# Patient Record
Sex: FEMALE | Race: WHITE | NOT HISPANIC OR LATINO | Employment: FULL TIME
[De-identification: names, ages, dates, MRNs, and addresses within clinical notes are randomized per-mention and may not be internally consistent; named-entity substitution may affect disease eponyms.]

---

## 2017-06-10 ENCOUNTER — HEALTH MAINTENANCE LETTER (OUTPATIENT)
Age: 27
End: 2017-06-10

## 2017-08-25 ENCOUNTER — COMMUNICATION - HEALTHEAST (OUTPATIENT)
Dept: FAMILY MEDICINE | Facility: CLINIC | Age: 27
End: 2017-08-25

## 2017-08-29 ENCOUNTER — COMMUNICATION - HEALTHEAST (OUTPATIENT)
Dept: FAMILY MEDICINE | Facility: CLINIC | Age: 27
End: 2017-08-29

## 2017-08-31 ENCOUNTER — OFFICE VISIT - HEALTHEAST (OUTPATIENT)
Dept: FAMILY MEDICINE | Facility: CLINIC | Age: 27
End: 2017-08-31

## 2017-08-31 DIAGNOSIS — T75.3XXA MOTION SICKNESS, INITIAL ENCOUNTER: ICD-10-CM

## 2017-08-31 DIAGNOSIS — R55 SYNCOPE, UNSPECIFIED SYNCOPE TYPE: ICD-10-CM

## 2017-08-31 DIAGNOSIS — M41.9 SCOLIOSIS, UNSPECIFIED SCOLIOSIS TYPE, UNSPECIFIED SPINAL REGION: ICD-10-CM

## 2017-08-31 DIAGNOSIS — R07.81 RIB PAIN ON RIGHT SIDE: ICD-10-CM

## 2017-08-31 ASSESSMENT — MIFFLIN-ST. JEOR: SCORE: 1337.76

## 2017-09-04 ENCOUNTER — COMMUNICATION - HEALTHEAST (OUTPATIENT)
Dept: FAMILY MEDICINE | Facility: CLINIC | Age: 27
End: 2017-09-04

## 2017-11-19 ENCOUNTER — COMMUNICATION - HEALTHEAST (OUTPATIENT)
Dept: FAMILY MEDICINE | Facility: CLINIC | Age: 27
End: 2017-11-19

## 2018-02-15 ENCOUNTER — COMMUNICATION - HEALTHEAST (OUTPATIENT)
Dept: FAMILY MEDICINE | Facility: CLINIC | Age: 28
End: 2018-02-15

## 2018-05-07 ENCOUNTER — COMMUNICATION - HEALTHEAST (OUTPATIENT)
Dept: FAMILY MEDICINE | Facility: CLINIC | Age: 28
End: 2018-05-07

## 2018-07-02 ENCOUNTER — RECORDS - HEALTHEAST (OUTPATIENT)
Dept: ADMINISTRATIVE | Facility: OTHER | Age: 28
End: 2018-07-02

## 2018-08-03 ENCOUNTER — COMMUNICATION - HEALTHEAST (OUTPATIENT)
Dept: FAMILY MEDICINE | Facility: CLINIC | Age: 28
End: 2018-08-03

## 2018-10-26 ENCOUNTER — COMMUNICATION - HEALTHEAST (OUTPATIENT)
Dept: FAMILY MEDICINE | Facility: CLINIC | Age: 28
End: 2018-10-26

## 2018-11-05 ENCOUNTER — OFFICE VISIT - HEALTHEAST (OUTPATIENT)
Dept: FAMILY MEDICINE | Facility: CLINIC | Age: 28
End: 2018-11-05

## 2018-11-05 DIAGNOSIS — J30.2 SEASONAL ALLERGIC RHINITIS: ICD-10-CM

## 2018-11-05 DIAGNOSIS — R31.0 GROSS HEMATURIA: ICD-10-CM

## 2018-11-05 DIAGNOSIS — I95.9 HYPOTENSION, UNSPECIFIED HYPOTENSION TYPE: ICD-10-CM

## 2018-11-05 DIAGNOSIS — R30.0 DYSURIA: ICD-10-CM

## 2018-11-05 LAB
ALBUMIN UR-MCNC: NEGATIVE MG/DL
APPEARANCE UR: CLEAR
BACTERIA #/AREA URNS HPF: ABNORMAL HPF
BILIRUB UR QL STRIP: NEGATIVE
COLOR UR AUTO: YELLOW
GLUCOSE UR STRIP-MCNC: NEGATIVE MG/DL
HGB UR QL STRIP: ABNORMAL
KETONES UR STRIP-MCNC: NEGATIVE MG/DL
LEUKOCYTE ESTERASE UR QL STRIP: NEGATIVE
MUCOUS THREADS #/AREA URNS LPF: ABNORMAL LPF
NITRATE UR QL: NEGATIVE
PH UR STRIP: 6 [PH] (ref 5–8)
RBC #/AREA URNS AUTO: ABNORMAL HPF
SP GR UR STRIP: 1.02 (ref 1–1.03)
SQUAMOUS #/AREA URNS AUTO: ABNORMAL LPF
TRANS CELLS #/AREA URNS HPF: ABNORMAL LPF
UROBILINOGEN UR STRIP-ACNC: ABNORMAL
WBC #/AREA URNS AUTO: ABNORMAL HPF

## 2018-11-05 ASSESSMENT — MIFFLIN-ST. JEOR: SCORE: 1348.64

## 2018-11-06 ENCOUNTER — AMBULATORY - HEALTHEAST (OUTPATIENT)
Dept: LAB | Facility: CLINIC | Age: 28
End: 2018-11-06

## 2018-11-06 DIAGNOSIS — R31.0 GROSS HEMATURIA: ICD-10-CM

## 2018-11-06 LAB — BACTERIA SPEC CULT: NO GROWTH

## 2018-11-08 ENCOUNTER — AMBULATORY - HEALTHEAST (OUTPATIENT)
Dept: LAB | Facility: CLINIC | Age: 28
End: 2018-11-08

## 2018-11-08 DIAGNOSIS — R31.0 GROSS HEMATURIA: ICD-10-CM

## 2018-11-08 LAB
ALBUMIN UR-MCNC: NEGATIVE MG/DL
APPEARANCE UR: CLEAR
BACTERIA #/AREA URNS HPF: ABNORMAL HPF
BILIRUB UR QL STRIP: NEGATIVE
COLOR UR AUTO: YELLOW
GLUCOSE UR STRIP-MCNC: NEGATIVE MG/DL
HGB UR QL STRIP: ABNORMAL
KETONES UR STRIP-MCNC: NEGATIVE MG/DL
LEUKOCYTE ESTERASE UR QL STRIP: NEGATIVE
NITRATE UR QL: NEGATIVE
PH UR STRIP: 6.5 [PH] (ref 5–8)
RBC #/AREA URNS AUTO: ABNORMAL HPF
SP GR UR STRIP: 1.01 (ref 1–1.03)
SQUAMOUS #/AREA URNS AUTO: ABNORMAL LPF
UROBILINOGEN UR STRIP-ACNC: ABNORMAL
WBC #/AREA URNS AUTO: ABNORMAL HPF

## 2018-12-12 ENCOUNTER — COMMUNICATION - HEALTHEAST (OUTPATIENT)
Dept: FAMILY MEDICINE | Facility: CLINIC | Age: 28
End: 2018-12-12

## 2019-02-15 ENCOUNTER — OFFICE VISIT - HEALTHEAST (OUTPATIENT)
Dept: FAMILY MEDICINE | Facility: CLINIC | Age: 29
End: 2019-02-15

## 2019-02-15 DIAGNOSIS — J06.9 VIRAL URI WITH COUGH: ICD-10-CM

## 2019-02-15 ASSESSMENT — MIFFLIN-ST. JEOR: SCORE: 1345.47

## 2019-02-26 ENCOUNTER — COMMUNICATION - HEALTHEAST (OUTPATIENT)
Dept: FAMILY MEDICINE | Facility: CLINIC | Age: 29
End: 2019-02-26

## 2019-03-15 ENCOUNTER — COMMUNICATION - HEALTHEAST (OUTPATIENT)
Dept: FAMILY MEDICINE | Facility: CLINIC | Age: 29
End: 2019-03-15

## 2019-06-10 ENCOUNTER — COMMUNICATION - HEALTHEAST (OUTPATIENT)
Dept: FAMILY MEDICINE | Facility: CLINIC | Age: 29
End: 2019-06-10

## 2019-09-30 ENCOUNTER — HEALTH MAINTENANCE LETTER (OUTPATIENT)
Age: 29
End: 2019-09-30

## 2019-11-11 ENCOUNTER — OFFICE VISIT - HEALTHEAST (OUTPATIENT)
Dept: FAMILY MEDICINE | Facility: CLINIC | Age: 29
End: 2019-11-11

## 2019-11-11 DIAGNOSIS — J30.2 SEASONAL ALLERGIC RHINITIS, UNSPECIFIED TRIGGER: ICD-10-CM

## 2019-11-11 DIAGNOSIS — Z00.00 ROUTINE GENERAL MEDICAL EXAMINATION AT A HEALTH CARE FACILITY: ICD-10-CM

## 2019-11-11 LAB
ALBUMIN SERPL-MCNC: 4.2 G/DL (ref 3.5–5)
ALBUMIN UR-MCNC: NEGATIVE MG/DL
ALP SERPL-CCNC: 56 U/L (ref 45–120)
ALT SERPL W P-5'-P-CCNC: 13 U/L (ref 0–45)
ANION GAP SERPL CALCULATED.3IONS-SCNC: 8 MMOL/L (ref 5–18)
APPEARANCE UR: CLEAR
AST SERPL W P-5'-P-CCNC: 17 U/L (ref 0–40)
BACTERIA #/AREA URNS HPF: ABNORMAL HPF
BILIRUB SERPL-MCNC: 0.3 MG/DL (ref 0–1)
BILIRUB UR QL STRIP: NEGATIVE
BUN SERPL-MCNC: 13 MG/DL (ref 8–22)
CALCIUM SERPL-MCNC: 9.6 MG/DL (ref 8.5–10.5)
CHLORIDE BLD-SCNC: 103 MMOL/L (ref 98–107)
CHOLEST SERPL-MCNC: 274 MG/DL
CO2 SERPL-SCNC: 29 MMOL/L (ref 22–31)
COLOR UR AUTO: YELLOW
CREAT SERPL-MCNC: 0.77 MG/DL (ref 0.6–1.1)
ERYTHROCYTE [DISTWIDTH] IN BLOOD BY AUTOMATED COUNT: 13 % (ref 11–14.5)
FASTING STATUS PATIENT QL REPORTED: NO
GFR SERPL CREATININE-BSD FRML MDRD: >60 ML/MIN/1.73M2
GLUCOSE BLD-MCNC: 79 MG/DL (ref 70–125)
GLUCOSE UR STRIP-MCNC: NEGATIVE MG/DL
HCT VFR BLD AUTO: 42.7 % (ref 35–47)
HDLC SERPL-MCNC: 100 MG/DL
HGB BLD-MCNC: 14.1 G/DL (ref 12–16)
HGB UR QL STRIP: ABNORMAL
KETONES UR STRIP-MCNC: NEGATIVE MG/DL
LDLC SERPL CALC-MCNC: 159 MG/DL
LEUKOCYTE ESTERASE UR QL STRIP: NEGATIVE
MCH RBC QN AUTO: 28.5 PG (ref 27–34)
MCHC RBC AUTO-ENTMCNC: 33.1 G/DL (ref 32–36)
MCV RBC AUTO: 86 FL (ref 80–100)
NITRATE UR QL: NEGATIVE
PH UR STRIP: 7.5 [PH] (ref 5–8)
PLATELET # BLD AUTO: 378 THOU/UL (ref 140–440)
PMV BLD AUTO: 8.5 FL (ref 7–10)
POTASSIUM BLD-SCNC: 4.3 MMOL/L (ref 3.5–5)
PROT SERPL-MCNC: 7.5 G/DL (ref 6–8)
RBC # BLD AUTO: 4.95 MILL/UL (ref 3.8–5.4)
RBC #/AREA URNS AUTO: ABNORMAL HPF
SODIUM SERPL-SCNC: 140 MMOL/L (ref 136–145)
SP GR UR STRIP: 1.02 (ref 1–1.03)
SQUAMOUS #/AREA URNS AUTO: ABNORMAL LPF
TRIGL SERPL-MCNC: 77 MG/DL
TSH SERPL DL<=0.005 MIU/L-ACNC: 1.65 UIU/ML (ref 0.3–5)
UROBILINOGEN UR STRIP-ACNC: ABNORMAL
WBC #/AREA URNS AUTO: ABNORMAL HPF
WBC: 11.1 THOU/UL (ref 4–11)

## 2019-11-11 ASSESSMENT — MIFFLIN-ST. JEOR: SCORE: 1351.82

## 2019-11-21 ENCOUNTER — OFFICE VISIT - HEALTHEAST (OUTPATIENT)
Dept: FAMILY MEDICINE | Facility: CLINIC | Age: 29
End: 2019-11-21

## 2019-11-21 DIAGNOSIS — Z12.4 CERVICAL CANCER SCREENING: ICD-10-CM

## 2019-11-21 DIAGNOSIS — Z71.84 ENCOUNTER FOR COUNSELING FOR TRAVEL: ICD-10-CM

## 2019-11-22 LAB
HPV SOURCE: NORMAL
HUMAN PAPILLOMA VIRUS 16 DNA: NEGATIVE
HUMAN PAPILLOMA VIRUS 18 DNA: NEGATIVE
HUMAN PAPILLOMA VIRUS FINAL DIAGNOSIS: NORMAL
HUMAN PAPILLOMA VIRUS OTHER HR: NEGATIVE
SPECIMEN DESCRIPTION: NORMAL

## 2019-11-27 ENCOUNTER — COMMUNICATION - HEALTHEAST (OUTPATIENT)
Dept: SCHEDULING | Facility: CLINIC | Age: 29
End: 2019-11-27

## 2019-11-27 DIAGNOSIS — Z71.84 ENCOUNTER FOR COUNSELING FOR TRAVEL: ICD-10-CM

## 2019-12-03 LAB
BKR LAB AP ABNORMAL BLEEDING: NO
BKR LAB AP BIRTH CONTROL/HORMONES: ABNORMAL
BKR LAB AP CERVICAL APPEARANCE: NORMAL
BKR LAB AP GYN ADEQUACY: ABNORMAL
BKR LAB AP GYN INTERPRETATION: ABNORMAL
BKR LAB AP HPV REFLEX: ABNORMAL
BKR LAB AP LMP: ABNORMAL
BKR LAB AP PATIENT STATUS: ABNORMAL
BKR LAB AP PREVIOUS ABNORMAL: ABNORMAL
BKR LAB AP PREVIOUS NORMAL: ABNORMAL
HIGH RISK?: NO
PATH REPORT.COMMENTS IMP SPEC: ABNORMAL
RESULT FLAG (HE HISTORICAL CONVERSION): ABNORMAL

## 2019-12-04 ENCOUNTER — COMMUNICATION - HEALTHEAST (OUTPATIENT)
Dept: FAMILY MEDICINE | Facility: CLINIC | Age: 29
End: 2019-12-04

## 2020-02-20 ENCOUNTER — COMMUNICATION - HEALTHEAST (OUTPATIENT)
Dept: FAMILY MEDICINE | Facility: CLINIC | Age: 30
End: 2020-02-20

## 2020-02-20 DIAGNOSIS — Z78.9 USES BIRTH CONTROL: ICD-10-CM

## 2020-03-10 ENCOUNTER — MYC MEDICAL ADVICE (OUTPATIENT)
Dept: FAMILY MEDICINE | Facility: CLINIC | Age: 30
End: 2020-03-10

## 2020-03-12 ENCOUNTER — AMBULATORY - HEALTHEAST (OUTPATIENT)
Dept: NURSING | Facility: CLINIC | Age: 30
End: 2020-03-12

## 2020-09-17 ENCOUNTER — OFFICE VISIT - HEALTHEAST (OUTPATIENT)
Dept: FAMILY MEDICINE | Facility: CLINIC | Age: 30
End: 2020-09-17

## 2020-09-17 DIAGNOSIS — Z23 NEED FOR VACCINATION AGAINST HEPATITIS A: ICD-10-CM

## 2020-09-17 DIAGNOSIS — B37.2 MONILIAL INTERTRIGO: ICD-10-CM

## 2020-09-17 LAB
CLUE CELLS: NORMAL
TRICHOMONAS, WET PREP: NORMAL
YEAST, WET PREP: NORMAL

## 2020-11-05 ENCOUNTER — COMMUNICATION - HEALTHEAST (OUTPATIENT)
Dept: FAMILY MEDICINE | Facility: CLINIC | Age: 30
End: 2020-11-05

## 2020-11-05 DIAGNOSIS — Z78.9 USES BIRTH CONTROL: ICD-10-CM

## 2021-01-15 ENCOUNTER — HEALTH MAINTENANCE LETTER (OUTPATIENT)
Age: 31
End: 2021-01-15

## 2021-02-10 ENCOUNTER — OFFICE VISIT - HEALTHEAST (OUTPATIENT)
Dept: FAMILY MEDICINE | Facility: CLINIC | Age: 31
End: 2021-02-10

## 2021-02-10 ENCOUNTER — AMBULATORY - HEALTHEAST (OUTPATIENT)
Dept: FAMILY MEDICINE | Facility: CLINIC | Age: 31
End: 2021-02-10

## 2021-02-10 DIAGNOSIS — R09.81 NASAL CONGESTION: ICD-10-CM

## 2021-02-12 ENCOUNTER — COMMUNICATION - HEALTHEAST (OUTPATIENT)
Dept: FAMILY MEDICINE | Facility: CLINIC | Age: 31
End: 2021-02-12

## 2021-02-13 ENCOUNTER — COMMUNICATION - HEALTHEAST (OUTPATIENT)
Dept: SCHEDULING | Facility: CLINIC | Age: 31
End: 2021-02-13

## 2021-05-29 NOTE — TELEPHONE ENCOUNTER
Refill Approved    Rx renewed per Medication Renewal Policy. Medication was last renewed on 3/18/19.    Barbie Pedro, [facility] Triage/Med Refill 6/11/2019     Requested Prescriptions   Pending Prescriptions Disp Refills     drospirenone-ethinyl estradiol (GIANVI, 28,) 3-0.02 mg per tablet [Pharmacy Med Name: GIANVI 3 MG-0.02 MG TABLET] 84 tablet 0     Sig: TAKE 1 TAB BY MOUTH ONE TIME DAILY, NO FURTHER REFILLS UNTIL SEEN FOR A PHYSICAL. CALL 24/7       Oral Contraceptives Protocol Passed - 6/10/2019 12:35 PM        Passed - Visit with PCP or prescribing provider visit in last 12 months      Last office visit with prescriber/PCP: 6/10/2016 Cal Perera MD OR same dept: 11/5/2018 Juan Dalton MD OR same specialty: 2/15/2019 Kenya Keith MD  Last physical: Visit date not found Last MTM visit: Visit date not found   Next visit within 3 mo: Visit date not found  Next physical within 3 mo: Visit date not found  Prescriber OR PCP: Cal Perera MD  Last diagnosis associated with med order: 1. Birth control  - GIANVI, 28, 3-0.02 mg per tablet [Pharmacy Med Name: GIANVI 3 MG-0.02 MG TABLET]; TAKE 1 TAB BY MOUTH ONE TIME DAILY, NO FURTHER REFILLS UNTIL SEEN FOR A PHYSICAL. CALL 24/7  Dispense: 84 tablet; Refill: 0    If protocol passes may refill for 12 months if within 3 months of last provider visit (or a total of 15 months).

## 2021-05-31 VITALS — WEIGHT: 133.6 LBS | HEIGHT: 66 IN | BODY MASS INDEX: 21.47 KG/M2

## 2021-06-02 VITALS — BODY MASS INDEX: 21.86 KG/M2 | HEIGHT: 66 IN | WEIGHT: 136 LBS

## 2021-06-02 VITALS — HEIGHT: 66 IN | WEIGHT: 135.3 LBS | BODY MASS INDEX: 21.74 KG/M2

## 2021-06-03 VITALS
WEIGHT: 136.7 LBS | BODY MASS INDEX: 21.97 KG/M2 | SYSTOLIC BLOOD PRESSURE: 104 MMHG | HEIGHT: 66 IN | DIASTOLIC BLOOD PRESSURE: 68 MMHG | HEART RATE: 80 BPM

## 2021-06-03 NOTE — TELEPHONE ENCOUNTER
Medication Question or Clarification  Who is calling: Pharmacy: [facility]  What medication are you calling about? (include dose and sig)   typhoid (VIVOTIF) SR capsule 4 capsule 0 11/21/2019 11/28/2019    Sig - Route: Take 1 capsule by mouth every other day for 7 days. - Oral    Sent to pharmacy as: typhoid vaccine,live,attenuated 2 billion unit capsule,delayed release (VIVOTIF)    E-Prescribing Status: Receipt confirmed by pharmacy (11/21/2019 10:49 AM CST)        Who prescribed the medication?: Cal Perera MD  What is your question/concern?: the patient in error has already taken this medication.  The patient is not leaving out of the country until 4/20.  Pharmacy is requesting another prescription for patient to take just prior to departure.  Pharmacy: [facility] # [id number]  Okay to leave a detailed message?: Yes  Site CMT - Please call the pharmacy to obtain any additional needed information.

## 2021-06-03 NOTE — PATIENT INSTRUCTIONS - HE
Have had hepatitis A vaccine.  If you have not, I would recommend that you schedule a nurse only visit for this

## 2021-06-03 NOTE — PROGRESS NOTES
Assessment:     1. Routine general medical examination at a health care facility  Comprehensive Metabolic Panel    HM2(CBC w/o Differential)    Urinalysis-UC if Indicated    Lipid Cascade    Thyroid [place]   2. Seasonal allergic rhinitis, unspecified trigger  HM2(CBC w/o Differential)       Plan:     1. Routine general medical examination at a health care facility  Patient will follow-up with Dr. Lise Bond for her Pap smear and follow-up of her colposcopy with her abnormal Pap of 3 or 4 years ago  - Comprehensive Metabolic Panel  - HM2(CBC w/o Differential)  - Urinalysis-UC if Indicated  - Lipid Cascade  - Thyroid [place]    2. Seasonal allergic rhinitis, unspecified trigger  Patient will discontinue her generic Afrin daily nasal insufflation; if she needs help with rebound we may prescribe some corticosteroid  - HM2(CBC w/o Differential)      Subjective:   Elaine comes in for general physical exam.  Unfortunately there was some confusion and signing up as she will need a follow-up Pap smear after an abnormal past medical history and colposcopy follow-up.  She will make this appointment with Dr. Bond her primary care physician.  Today we will do basic blood screening as she is 29 years old.  She is had a past medical history of borderline thyroid disease probably related to a viral infection which sounds like an investigation was done for some Hashimoto variant.  Ultimately work-up did ended with no real physical findings and no indication for treatment.  I feel today I will just repeat a thyroid cascade.  We will get a basic metabolic profile and hemogram.  Patient gives a negative review of systems save for an unfortunate incident where she had a root canal in her upper left last molar where during anesthesia piece of 30-gauge needle broke off whereby the anesthetic ended up coming out the patient's nose.  Ultimately a oral surgeon was called in the small millimeter to 2 mm piece of needle left in her  "paranasal sinus area.  She is had a flare of her allergic rhinitis since this episode and has been using generic Afrin.  We are going to get her off of that as soon as possible here we may offers some corticosteroid relief for that.  She will follow-up with Dr. Bond when she comes in for her Pap smear.  All medical questions asked were answered I have given the patient interim care here and will be happy to follow along.  Very pleasant young lady.    Review of Systems: A complete 14 point review of systems was obtained and is negative or as stated in the history of present illness.    Past Medical History:   Diagnosis Date     Environmental allergies      Family History   Problem Relation Age of Onset     No Medical Problems Mother      Arthritis Maternal Grandmother      Diabetes Maternal Grandmother      Kidney disease Maternal Grandmother         going to have kidney transplant     Diabetes Paternal Grandmother      Diabetes Paternal Grandfather      Vision loss Paternal Grandfather      No Medical Problems Sister      Vision loss Maternal Grandfather      Past Surgical History:   Procedure Laterality Date     TONSILLECTOMY AND ADENOIDECTOMY       Social History     Tobacco Use     Smoking status: Never Smoker     Smokeless tobacco: Never Used   Substance Use Topics     Alcohol use: Yes     Alcohol/week: 1.0 standard drinks     Types: 1 Glasses of wine per week     Comment: Socially     Drug use: No         Objective:   /68   Pulse 80   Ht 5' 6\" (1.676 m)   Wt 136 lb 11.2 oz (62 kg)   LMP  (LMP Unknown)   BMI 22.06 kg/m      General Appearance:  Alert, cooperative, no distress  Head:  Normocephalic, no obvious abnormality  Ears: TM anatomy normal  Eyes:  PERRL, EOM's intact, conjunctiva and corneas clear  Nose:  Nares symmetrical, septum midline, mucosa pink, no sinus tenderness  Throat:  Lips, tongue, and mucosa are moist, pink, and intact  Neck:  Supple, symmetrical, trachea midline, no adenopathy; " thyroid: no enlargement, symmetric,no tenderness/mass/nodules; no carotid bruit, no JVD  Back:  Symmetrical, no curvature, ROM normal, no CVA tenderness  Chest/Breast:  No mass or tenderness  Lungs:  Clear to auscultation bilaterally, respirations unlabored   Heart:  Normal PMI, regular rate & rhythm, S1 and S2 normal, no murmurs, rubs, or gallops  Abdomen:  Soft, non-tender, bowel sounds active all four quadrants, no mass, or organomegaly  Musculoskeletal:  Tone and strength strong and symmetrical, all extremities  Lymphatic:  No adenopathy  Skin/Hair/Nails:  Skin warm, dry, and intact, no rashes  Neurologic:  Alert and oriented x3, no cranial nerve deficits, normal strength and tone, gait steady  Extremities:  No edema.  Malu's sign negative.    Genitourinary: deferred  Pulses:  Equal bilaterally           This note has been dictated using voice recognition software. Any grammatical or context distortions are unintentional and inherent to the the software.

## 2021-06-03 NOTE — PROGRESS NOTES
Assessment/Plan:     1. Cervical cancer screening  Gynecologic Cytology (PAP Smear)   2. Encounter for counseling for travel  typhoid (VIVOTIF) SR capsule    azithromycin (ZITHROMAX) 500 MG tablet       Diagnoses and all orders for this visit:    Cervical cancer screening  -     Gynecologic Cytology (PAP Smear)    Encounter for counseling for travel  -     typhoid (VIVOTIF) SR capsule; Take 1 capsule by mouth every other day for 7 days.  Dispense: 4 capsule; Refill: 0  -     azithromycin (ZITHROMAX) 500 MG tablet; Take 1 tablet (500 mg total) by mouth daily for 3 days. Take as needed for traveler's diarrhea  Dispense: 3 tablet; Refill: 0  -Reviewed CDC recommendations for travel to [facility].  She believes that she has had hepatitis a and B vaccines but will check at home for those vaccine records.  We discussed that she could return for nurse only visit to receive these vaccines if needed.  Recommend influenza vaccine and this is administered today.  Counseled on vaccine and side effects.  Recommend typhoid vaccine and prescription is sent to pharmacy.  Counseled on use of this vaccine and side effects.  Discussed importance of sunscreen and insect repellent.  Discussed general over-the-counter medications that should be brought along during travel.  Provided prescription for azithromycin to use as needed for traveler's diarrhea.  Counseled on use of this medication and side effects.    Need for influenza vaccine  -     Influenza,Seasonal,Quad,INJ =/>6months    Preconception counseling:  Discussed starting a prenatal vitamin in anticipation of pregnancy.  Discussed that with travel to [facility], there is unknown risk associated with Zika virus and she should stay up-to-date on CDC information.  She should avoid attempting pregnancy for 3 months following travel to [place].  Discussed importance of healthy diet, regular exercise, rest and avoidance of alcohol while trying to conceive.       Subjective:      Elaine GAO  Todd is a 29 y.o. female who comes in today for Pap smear.  She was seen last week for a physical with Dr. Paz.  She was having her menstrual cycle at the time and did not want to have a Pap smear performed.  She reports that her last menstrual period started on 11/10/2019.  She is on birth control pills.  Last Pap smear was in 2016.  This showed atypical squamous cells of undetermined significance but HPV test was negative.  Follow-up Pap in 3 years was recommended.  She also wishes to discuss recommendations for preparing for pregnancy.  She is thinking of going off of birth control at next summer and trying for pregnancy.  She is also planning a trip to [facility] in April and would like to discuss any recommended vaccines that would be needed for upcoming travel.  We reviewed her medications and allergies.  Chart is updated.  Review of systems otherwise negative.  No other concerns or questions today.    Current Outpatient Medications   Medication Sig Dispense Refill     amoxicillin (AMOXIL) 500 MG tablet Take 500 mg by mouth 3 (three) times a day.       azithromycin (ZITHROMAX) 500 MG tablet Take 1 tablet (500 mg total) by mouth daily for 3 days. Take as needed for traveler's diarrhea 3 tablet 0     cranberry extract 50 mg Chew Chew.       drospirenone-ethinyl estradiol (GIANVI, 28,) 3-0.02 mg per tablet TAKE 1 TAB BY MOUTH ONE TIME DAILY, NO FURTHER REFILLS UNTIL SEEN FOR A PHYSICAL. CALL 24/7 84 tablet 2     multivitamin with minerals (THERA-M) 9 mg iron-400 mcg Tab tablet Take 1 tablet by mouth daily.       traMADol (ULTRAM) 50 mg tablet Take 1 tablet (50 mg total) by mouth every 6 (six) hours as needed for pain. 8 tablet 0     typhoid (VIVOTIF) SR capsule Take 1 capsule by mouth every other day for 7 days. 4 capsule 0     No current facility-administered medications for this visit.        Past Medical History, Family History, and Social History reviewed.  Past Medical History:   Diagnosis Date      Environmental allergies      Past Surgical History:   Procedure Laterality Date     TONSILLECTOMY AND ADENOIDECTOMY       Sulfa (sulfonamide antibiotics) and Sulfacetamide  Family History   Problem Relation Age of Onset     No Medical Problems Mother      Arthritis Maternal Grandmother      Diabetes Maternal Grandmother      Kidney disease Maternal Grandmother         going to have kidney transplant     Diabetes Paternal Grandmother      Diabetes Paternal Grandfather      Vision loss Paternal Grandfather      No Medical Problems Sister      Vision loss Maternal Grandfather      Social History     Socioeconomic History     Marital status: Single     Spouse name: Not on file     Number of children: Not on file     Years of education: Not on file     Highest education level: Not on file   Occupational History     Not on file   Social Needs     Financial resource strain: Not on file     Food insecurity:     Worry: Not on file     Inability: Not on file     Transportation needs:     Medical: Not on file     Non-medical: Not on file   Tobacco Use     Smoking status: Never Smoker     Smokeless tobacco: Never Used   Substance and Sexual Activity     Alcohol use: Yes     Alcohol/week: 1.0 standard drinks     Types: 1 Glasses of wine per week     Comment: Socially     Drug use: No     Sexual activity: Yes     Partners: Male     Birth control/protection: Pill, OCP     Comment: in relationship    Lifestyle     Physical activity:     Days per week: Not on file     Minutes per session: Not on file     Stress: Not on file   Relationships     Social connections:     Talks on phone: Not on file     Gets together: Not on file     Attends Judaism service: Not on file     Active member of club or organization: Not on file     Attends meetings of clubs or organizations: Not on file     Relationship status: Not on file     Intimate partner violence:     Fear of current or ex partner: Not on file     Emotionally abused: Not on file      Physically abused: Not on file     Forced sexual activity: Not on file   Other Topics Concern     Not on file   Social History Narrative     Not on file         Review of systems is as stated in HPI, and the remainder of the 10 system review is otherwise negative.    Objective:     Vitals:    11/21/19 1024   BP: 100/60   Patient Site: Left Arm   Patient Position: Sitting   Cuff Size: Adult Regular   Pulse: 90   Temp: 98.1  F (36.7  C)   TempSrc: Oral   SpO2: 95%   Weight: 140 lb 12.8 oz (63.9 kg)    Body mass index is 22.73 kg/m .    General appearance: alert, appears stated age and cooperative  Pelvic: cervix normal in appearance, external genitalia normal, no adnexal masses or tenderness, no cervical motion tenderness, rectovaginal septum normal, uterus normal size, shape, and consistency and vagina normal without discharge          This note has been dictated using voice recognition software. Any grammatical or context distortions are unintentional and inherent to the the software.

## 2021-06-04 VITALS
HEART RATE: 90 BPM | BODY MASS INDEX: 22.73 KG/M2 | DIASTOLIC BLOOD PRESSURE: 60 MMHG | TEMPERATURE: 98.1 F | OXYGEN SATURATION: 95 % | WEIGHT: 140.8 LBS | SYSTOLIC BLOOD PRESSURE: 100 MMHG

## 2021-06-04 NOTE — TELEPHONE ENCOUNTER
Left message to call back for: results  Information to relay to patient:  Ok to give pt results upon return call.

## 2021-06-04 NOTE — TELEPHONE ENCOUNTER
----- Message from Lise Bond MD sent at 12/4/2019  7:14 AM CST -----  Please let pt know that her pap smear shows atypical cells but HPV test is negative.  Based on current guidelines a follow-up pap smear in 3 years is recommended

## 2021-06-05 VITALS
BODY MASS INDEX: 21.73 KG/M2 | HEART RATE: 80 BPM | OXYGEN SATURATION: 98 % | WEIGHT: 134.6 LBS | SYSTOLIC BLOOD PRESSURE: 104 MMHG | DIASTOLIC BLOOD PRESSURE: 72 MMHG

## 2021-06-06 NOTE — TELEPHONE ENCOUNTER
Refill Approved    Rx renewed per Medication Renewal Policy. Medication was last renewed on 6/11/19.    Christina Han, [facility] Triage/Med Refill 2/23/2020     Requested Prescriptions   Pending Prescriptions Disp Refills     drospirenone-ethinyl estradioL (GIANVI, 28,) 3-0.02 mg per tablet [Pharmacy Med Name: GIANVI 3 MG-0.02 MG TABLET] 84 tablet 2     Sig: TAKE ONE TABLET BY MOUTH ONCE DAILY. NO FURTHER REFILLS UNTIL SEEN       Oral Contraceptives Protocol Passed - 2/20/2020  2:48 AM        Passed - Visit with PCP or prescribing provider visit in last 12 months      Last office visit with prescriber/PCP: 6/10/2016 Cal Perera MD OR same dept: 11/21/2019 Lise Bond MD OR same specialty: 11/21/2019 Lise Bond MD  Last physical: 11/11/2019 Last MTM visit: Visit date not found   Next visit within 3 mo: Visit date not found  Next physical within 3 mo: Visit date not found  Prescriber OR PCP: Cal Perera MD  Last diagnosis associated with med order: 1. Uses birth control  - GIANVI, 28, 3-0.02 mg per tablet [Pharmacy Med Name: GIANVI 3 MG-0.02 MG TABLET]; TAKE ONE TABLET BY MOUTH ONCE DAILY. NO FURTHER REFILLS UNTIL SEEN  Dispense: 84 tablet; Refill: 2    If protocol passes may refill for 12 months if within 3 months of last provider visit (or a total of 15 months).

## 2021-06-11 NOTE — PROGRESS NOTES
Assessment:     1. Monilial intertrigo  Wet Prep, Vaginal    triamcinolone (KENALOG) 0.1 % cream   2. Need for vaccination against hepatitis A         Plan:     1. Monilial intertrigo  Patient did use some Lotrimin cream which inflamed the area further a wet prep was negative for all pathogens she needs an anti-inflammatory agent and will prescribe the following twice daily  - Wet Prep, Vaginal  - triamcinolone (KENALOG) 0.1 % cream; Apply twice daily  Dispense: 60 g; Refill: 1    2. Need for vaccination against hepatitis A  Updated as follows      Subjective:   Graciela noticed some red spots on her left labial area which increased in size and discomfort after a gym workout she became very sweaty during the workout and did not shower afterwards the inflammatory area increased and went into her intertriginous areas between her buttock and has now spread.  She has had no discharge she did buy some OTC monilial cream and suppositories which just inflamed area further.  Suspicion is for intertrigo secondary to inappropriate medication and the effect of a workout.  We will prescribe a corticosteroid she will apply it twice daily and I think she will do fine.    Review of Systems: A complete 14 point review of systems was obtained and is negative or as stated in the history of present illness.    Past Medical History:   Diagnosis Date     Environmental allergies      Family History   Problem Relation Age of Onset     No Medical Problems Mother      Arthritis Maternal Grandmother      Diabetes Maternal Grandmother      Kidney disease Maternal Grandmother         going to have kidney transplant     Diabetes Paternal Grandmother      Diabetes Paternal Grandfather      Vision loss Paternal Grandfather      No Medical Problems Sister      Vision loss Maternal Grandfather      Past Surgical History:   Procedure Laterality Date     TONSILLECTOMY AND ADENOIDECTOMY       Social History     Tobacco Use     Smoking status: Never Smoker      Smokeless tobacco: Never Used   Substance Use Topics     Alcohol use: Yes     Alcohol/week: 1.0 standard drinks     Types: 1 Glasses of wine per week     Comment: Socially     Drug use: No         Objective:   /72 (Patient Site: Right Arm, Patient Position: Sitting, Cuff Size: Adult Regular)   Pulse 80   Wt 134 lb 9.6 oz (61.1 kg)   SpO2 98%   BMI 21.73 kg/m      General Appearance:  Alert, cooperative, no distress  Head:  Normocephalic, no obvious abnormality  Ears: TM anatomy normal  Eyes:  PERRL, EOM's intact, conjunctiva and corneas clear  Nose:  Nares symmetrical, septum midline, mucosa pink, no sinus tenderness  Throat:  Lips, tongue, and mucosa are moist, pink, and intact  Neck:  Supple, symmetrical, trachea midline, no adenopathy; thyroid: no enlargement, symmetric,no tenderness/mass/nodules; no carotid bruit, no JVD  Back:  Symmetrical, no curvature, ROM normal, no CVA tenderness  Chest/Breast:  No mass or tenderness  Lungs:  Clear to auscultation bilaterally, respirations unlabored   Heart:  Normal PMI, regular rate & rhythm, S1 and S2 normal, no murmurs, rubs, or gallops  Abdomen:  Soft, non-tender, bowel sounds active all four quadrants, no mass, or organomegaly  Musculoskeletal:  Tone and strength strong and symmetrical, all extremities  Lymphatic:  No adenopathy  Skin/Hair/Nails:  Skin warm, dry, and intact, no rashes  Neurologic:  Alert and oriented x3, no cranial nerve deficits, normal strength and tone, gait steady  Extremities:  No edema.  Malu's sign negative.    Genitourinary: External vaginal exam reveals intertrigo but no discharge on the inner vault a wet prep was negative pulses:  Equal bilaterally           This note has been dictated using voice recognition software. Any grammatical or context distortions are unintentional and inherent to the the software.

## 2021-06-12 NOTE — PROGRESS NOTES
"Assessment and Plan:     1. Rib pain on right side     2. Scoliosis, unspecified scoliosis type, unspecified spinal region     3. Motion sickness, initial encounter     4. Syncope, unspecified syncope type       I reviewed the paperwork with her regarding possible complications with scuba diving.  Patient has a relative risk of complications with scoliosis.  I also discussed my concerns of the pressure within the water causing her ribs symptoms.  She has a history of fainting, but it appears as though she had causes including illness, dehydration, and heat exposure.  I told her that I would like to discuss this further with her PCP, Dr. Perera.  She is content with the plan.    ADDENDUM:  I spoke with Dr. Perera who is comfortable with her attending scuba diving lessons.  I completed the paperwork.     Subjective:     Elaine is a 27 y.o. female presenting to the clinic for for completion of paperwork.  Patient signed up for scuba course at her school.  Patient had answered yes within her medical history to frequent motion sickness and history of fainting.  Patient states she has passed out 3 times in the past.  The first occurred at the age of 6 when she was ill.  The second occurred at the age of 22 she was dehydrated after alcohol consumption.  Her third episode happened 2 years ago when she was sitting in the sun for too long.  Patient states her symptoms lasted for a few seconds.  She had no long-term complications.  She did not need to go the emergency room.  Patient also takes tramadol as needed for \"a rib that pops out.  \"Patient sees a chiropractor 2 times per week to prevent this from happening.  She is prescribed tramadol and 30 pills will last her for 1 year.  She will only take the tramadol when the chiropractor is closed.  Patient states she develops rib pain when she lifts heavy objects or if she is crouched in an odd position.  She also experiences it with vomiting.  The chiropractor has also been " "trying to improve her mild scoliosis.    Review of Systems: A complete 14 point review of systems was obtained and is negative or as stated in the history of present illness.    Social History     Social History     Marital status: Single     Spouse name: N/A     Number of children: N/A     Years of education: N/A     Occupational History     Not on file.     Social History Main Topics     Smoking status: Never Smoker     Smokeless tobacco: Not on file     Alcohol use 0.6 oz/week     1 Glasses of wine per week      Comment: Socially     Drug use: No     Sexual activity: Yes     Partners: Male     Birth control/ protection: Pill, OCP      Comment: in relationship      Other Topics Concern     Not on file     Social History Narrative       Active Ambulatory Problems     Diagnosis Date Noted     Cervical dysplasia 09/10/2015     Resolved Ambulatory Problems     Diagnosis Date Noted     No Resolved Ambulatory Problems     Past Medical History:   Diagnosis Date     Environmental allergies        Family History   Problem Relation Age of Onset     No Medical Problems Mother      Arthritis Maternal Grandmother      Diabetes Maternal Grandmother      Kidney disease Maternal Grandmother      going to have kidney transplant     Diabetes Paternal Grandmother      Diabetes Paternal Grandfather      Vision loss Paternal Grandfather      No Medical Problems Sister      Vision loss Maternal Grandfather        Objective:     /62 (Patient Site: Right Arm, Patient Position: Sitting, Cuff Size: Adult Regular)  Pulse 65  Ht 5' 6\" (1.676 m)  Wt 133 lb 9.6 oz (60.6 kg)  SpO2 99%  BMI 21.56 kg/m2    Patient is alert, in no obvious distress.   Skin: Warm, dry.    Neck: Supple, no lymphadenopathy. No thyromegaly.  Lungs:  Clear to auscultation. Respirations even and unlabored.  No wheezing or rales noted.   Heart:  Regular rate and rhythm.  No murmurs, S3, S4, gallops, or rubs.    Abdomen: Soft, nontender.  No organomegaly. Bowel " sounds normoactive. No guarding or masses noted.   Musculoskeletal:  Full ROM of extremities.  DTRs symmetrical, sensations intact.  No obvious deformity.  Muscle strength equal +5/5. Her spine appears to be aligned.   Neurological:  Cranial nerves 2-12 intact.

## 2021-06-12 NOTE — TELEPHONE ENCOUNTER
Refill Approved    Rx renewed per Medication Renewal Policy. Medication was last renewed on 2/23/20, last OV 9/17/20.    [employer], [facility] Triage/Med Refill 11/8/2020     Requested Prescriptions   Pending Prescriptions Disp Refills     drospirenone-ethinyl estradioL (GIANVI, 28,) 3-0.02 mg per tablet [Pharmacy Med Name: GIANVI 3 MG-0.02 MG TABLET] 84 tablet 2     Sig: TAKE ONE TABLET BY MOUTH ONCE DAILY. NO FURTHER REFILLS UNTIL SEEN       Oral Contraceptives Protocol Passed - 11/5/2020  3:22 AM        Passed - Visit with PCP or prescribing provider visit in last 12 months      Last office visit with prescriber/PCP: 9/17/2020 Cal Perera MD OR same dept: 9/17/2020 Cal Perera MD OR same specialty: 9/17/2020 Cal Perera MD  Last physical: 11/11/2019 Last MTM visit: Visit date not found   Next visit within 3 mo: Visit date not found  Next physical within 3 mo: Visit date not found  Prescriber OR PCP: Cal Perera MD  Last diagnosis associated with med order: 1. Uses birth control  - GIANVI, 28, 3-0.02 mg per tablet [Pharmacy Med Name: GIANVI 3 MG-0.02 MG TABLET]; TAKE ONE TABLET BY MOUTH ONCE DAILY. NO FURTHER REFILLS UNTIL SEEN  Dispense: 84 tablet; Refill: 2    If protocol passes may refill for 12 months if within 3 months of last provider visit (or a total of 15 months).

## 2021-06-14 ENCOUNTER — OFFICE VISIT - HEALTHEAST (OUTPATIENT)
Dept: FAMILY MEDICINE | Facility: CLINIC | Age: 31
End: 2021-06-14

## 2021-06-14 DIAGNOSIS — N39.0 URINARY TRACT INFECTION WITHOUT HEMATURIA, SITE UNSPECIFIED: ICD-10-CM

## 2021-06-15 NOTE — PROGRESS NOTES
Please call the patient and give them my message below,    Elaine,  Your results from your recent clinic visit show:  Your COVID test was negative.    If you have more questions please send me a MyCMobiliot message saying you saw me or call the clinic    Dr. Vinny Javier

## 2021-06-15 NOTE — TELEPHONE ENCOUNTER
----- Message from Vinny Ortiz MD sent at 2/12/2021  8:26 AM CST -----  Please call the patient and give them my message below,    Elaine,  Your results from your recent clinic visit show:  Your COVID test was negative.    If you have more questions please send me a MyChart message saying you saw me or call the clinic    Dr. Vinny Ortiz

## 2021-06-15 NOTE — PROGRESS NOTES
Elaine Brooks is a 30 y.o. female who is being evaluated via a billable video visit.      How would you like to obtain your AVS? MyChart.  If dropped from the video visit, the video invitation should be resent by: Text to cell phone: [phone]  Will anyone else be joining your video visit? No      Video Start Time: 8:51 AM    1. Nasal congestion  Will test for COVID. Otherwise discussed quarantining, symptomatic management, and ED precautions.   - Symptomatic COVID-19 Virus (CORONAVIRUS) PCR; Future      Assessment & Plan   69}     Vinny Ortiz MD  [facility]    Subjective   Elaine Brooks is 30 y.o. and presents today for the following health issues   HPI   Chief Complaint   Patient presents with     Sore Throat     xLast night, from the drainage, hard to swallow; not severe     Nasal Congestion     No fever      HPI:   Patint has a runny nose that started yesterday and has persisted. Congestion draining down her throat and causing some irritation.  Has woken her up with congestion. No changes in taste or smell, shortness of breath, cough, fevers, chills, body aches. She works as an  in [employer] at [employer]. She goes in for work.         Objective       Vitals:  No vitals were obtained today due to virtual visit.    Patient appeared well, could complete full sentences with no wheezing or coughing heard. Does not appear to be struggling to breath,     Video-Visit Details    Type of service:  Video Visit    Video End Time (time video stopped): 9:01 AM  Originating Location (pt. Location): Home    Distant Location (provider location):  [facility]     Platform used for Video Visit: Jone

## 2021-06-21 NOTE — PROGRESS NOTES
" Patient ID: Elaine Galloway is a 28 y.o. female.  /64  Temp 98  F (36.7  C)  Ht 5' 6\" (1.676 m)  Wt 136 lb (61.7 kg)  BMI 21.95 kg/m2    Assessment/Plan:                Diagnoses and all orders for this visit:    Dysuria  -     Urinalysis-UC if Indicated  -     Culture, Urine    Seasonal allergic rhinitis    Hypotension, unspecified hypotension type    Gross hematuria  -     Urinalysis-UC if Indicated  -     Culture, Urine      DISCUSSION  No indication that there is a urine infection at this moment therefore will not initiate any treatment pending culture results and microscopic urinalysis.  I also asked the patient to return later this week to repeat urinalysis to see if the hematuria resolves.  She will continue her current management of seasonal allergic rhinitis she will take the steps below to prevent hypotension.  Subjective:     HPI    Elaine Galloway is a 28 y.o. female she is here today to discuss several concerns.  Patient reports a history of frequent urinary tract infections.  Patient states that she has presented to urgent care clinics with urinary symptoms and been diagnosed with urinary tract infections on several occasions in the past year.  Patient states her mother also has a history of frequent urinary tract infections.  Review of available records indicates that there is only one occasion I can see in the past year where she had a culture confirmed urinary tract infection.  She had another culture that showed mixture of urogenital heaven and other times where her urine testing was obtained were no culture was obtained.  She reports no history of any complicated urinary tract infections requiring hospitalization.  Today we discussed basic means for preventing urinary tract infections through hydration and consideration of cranberry juice as is her preference.  She reports she had symptoms yesterday of dysuria and frequency.  She states his symptoms actually have resolved at this " "point.  She is actually somewhat more concerned because she saw some blood in her urine.  Patient states that she missed a dose of her birth control pill and thinks this may have caused some slight spotting but is unclear.  She has no history of kidney stones nor is there any reported pain symptoms consistent with kidney stones.  Patient had not noticed any blood in her urine when she left the sample today.    Patient states she has allergies that are thought to be seasonal allergies.  She is not certain exactly what she is allergic to.  Patient states she takes fexofenadine which seems to be most helpful.  We did discussion today regarding allergy management.  No acute concern is identified.    She states she has low normal blood pressure.  Blood pressure today is 104 systolic.  Patient states that she does have syncope and near syncope quite easily which is common.  Discussed ways to maintain blood pressure.  Discouraged use of alcohol encouraged her to make sure she does not become hypoglycemic as these considerations seem to have worsened the concern in the past.    Review of Systems  Complete review of systems is obtained.  Other than the specific considerations noted above complete review of systems is negative.        Objective:   Medications:  Current Outpatient Prescriptions   Medication Sig     cranberry extract 50 mg Chew Chew.     GIANVI, 28, 3-0.02 mg per tablet TAKE ONE TABLET BY MOUTH ONE TIME DAILY, DUE FOR APPT BEFORE NEXT REFILL     multivitamin with minerals (THERA-M) 9 mg iron-400 mcg Tab tablet Take 1 tablet by mouth daily.     traMADol (ULTRAM) 50 mg tablet Take 50 mg by mouth every 6 (six) hours as needed for pain.     Allergies:  Allergies   Allergen Reactions     Sulfa (Sulfonamide Antibiotics) Other (See Comments)     Hot flashes     Sulfacetamide Other (See Comments)     \"Fever and redness\"     Tobacco:   reports that she has never smoked. She has never used smokeless tobacco.     Physical " "Exam      /64  Temp 98  F (36.7  C)  Ht 5' 6\" (1.676 m)  Wt 136 lb (61.7 kg)  BMI 21.95 kg/m2    General Appearance:    Alert, cooperative, no distress, appears stated age   Eyes:   No scleral icterus or conjunctival irritation       Lungs:     Clear to auscultation bilaterally, respirations unlabored, no wheezes or crackles   Heart:    Regular rate and rhythm,  no murmur, rub   or gallop   Abdomen:     Soft, non-tender, bowel sounds active,     no masses, no organomegaly     Extremities:   Extremities normal, atraumatic, no cyanosis or edema   Skin:   Skin color, texture, turgor normal, no rashes or lesions   Neurologic:   Normal strength and sensation        throughout on gross examination.                         "

## 2021-06-24 NOTE — TELEPHONE ENCOUNTER
Controlled Substance Refill Request  Medication:   Requested Prescriptions     Pending Prescriptions Disp Refills     traMADol (ULTRAM) 50 mg tablet 8 tablet 0     Sig: Take 1 tablet (50 mg total) by mouth every 6 (six) hours as needed for pain.     Date Last Fill: 12/19/16  Pharmacy: [facility]   Submit electronically to pharmacy    Controlled Substance Agreement on File:   Encounter-Level CSA Scan Date:    There are no encounter-level csa scan date.       Last office visit with primary: 2/15/19

## 2021-06-24 NOTE — PROGRESS NOTES
"Assessment:     1. Viral URI with cough         Plan:     Elaine is a 28-year-old female presenting today with viral upper respiratory symptoms.  Reassurance was provided and recommendations as it relates to symptomatic medication.  We discussed viral versus bacterial infections and why at this point this fits with more of a viral illness.    Subjective:       28 y.o. female presents for evaluation of URI symptoms. She had onset of symptoms yesterday with sneezing and nasal congestion. She also noted a lot of clear nasal drainage. She has been taking OTC decongestants. She started feeling some soreness of her throat yesterday and some right ear discomfort. She feels tickles in her throat from drainage but no cough yet. However, her boyfriend has been ill for nearly one week with URI symptoms including a cough.       Reviewed: The following portions of the patient's history were reviewed and updated as appropriate: allergies, current medications, past family history, past medical history, past social history, past surgical history and problem list.    Review of Systems  Pertinent items are noted in HPI.        Objective:     BP 98/70 (Patient Site: Left Arm, Patient Position: Sitting, Cuff Size: Adult Regular)   Pulse 93   Temp 97.6  F (36.4  C) (Oral)   Ht 5' 6\" (1.676 m)   Wt 135 lb 4.8 oz (61.4 kg)   SpO2 99% Comment: RA  BMI 21.84 kg/m    General appearance: alert, appears stated age and cooperative  Ears: normal TM's and external ear canals both ears  Throat: lips, mucosa, and tongue normal; teeth and gums normal  Lungs: clear to auscultation bilaterally  Heart: regular rate and rhythm, S1, S2 normal, no murmur, click, rub or gallop      This note has been dictated using voice recognition software. Any grammatical or context distortions are unintentional and inherent to the software  "

## 2021-06-25 ENCOUNTER — COMMUNICATION - HEALTHEAST (OUTPATIENT)
Dept: FAMILY MEDICINE | Facility: CLINIC | Age: 31
End: 2021-06-25

## 2021-06-25 DIAGNOSIS — Z78.9 USES BIRTH CONTROL: ICD-10-CM

## 2021-06-25 NOTE — TELEPHONE ENCOUNTER
RN cannot approve Refill Request    RN can NOT refill this medication PCP messaged that patient is overdue for Physical  and Protocol failed and RN gave three month refill   Had a three refill on 12/14/18 with note that she was due to be seen.  Since then she has been seen in [facility], for URI or UTI.  Last physical 8/31/2016    . Last office visit: 6/10/2016 Cal Perera MD Last Physical: Visit date not found Last MTM visit: Visit date not found Last visit same specialty: 2/15/2019 Kenya Keith MD.  Next visit within 3 mo: Visit date not found  Next physical within 3 mo: Visit date not found      [facility], [facility] Triage/Med Refill 3/18/2019    Requested Prescriptions   Pending Prescriptions Disp Refills     drospirenone-ethinyl estradiol (GIANVI, 28,) 3-0.02 mg per tablet 84 tablet 0     Sig: TAKE ONE TABLET BY MOUTH ONE TIME DAILY, DUE FOR APPT BEFORE NEXT REFILL    Oral Contraceptives Protocol Passed - 3/15/2019 11:55 AM       Passed - Visit with PCP or prescribing provider visit in last 12 months     Last office visit with prescriber/PCP: 6/10/2016 Cal Perera MD OR same dept: 11/5/2018 Juan Dalton MD OR same specialty: 2/15/2019 Kenya Keith MD  Last physical: Visit date not found Last MTM visit: Visit date not found   Next visit within 3 mo: Visit date not found  Next physical within 3 mo: Visit date not found  Prescriber OR PCP: Cal Perera MD  Last diagnosis associated with med order: 1. Birth control  - drospirenone-ethinyl estradiol (GIANVI, 28,) 3-0.02 mg per tablet; TAKE ONE TABLET BY MOUTH ONE TIME DAILY, DUE FOR APPT BEFORE NEXT REFILL  Dispense: 84 tablet; Refill: 0    If protocol passes may refill for 12 months if within 3 months of last provider visit (or a total of 15 months).

## 2021-06-26 NOTE — TELEPHONE ENCOUNTER
Denied Rx, patient is overdue for annual office visit. Has only been seen for acute reasons. Last physical exam was 2019.     Vero Renteria RN, BSN Nurse Triage Advisor 8:37 AM 6/25/2021

## 2021-06-26 NOTE — PROGRESS NOTES
Assessment:     1. Urinary tract infection without hematuria, site unspecified  Urinalysis-UC if Indicated    Ambulatory referral to Urology    ciprofloxacin HCl (CIPRO) 500 MG tablet       Plan:     1. Urinary tract infection without hematuria, site unspecified  Patient is unable to give a sample today; she gives a history of intermittent urinary tract infections which sometimes do not show positive results because of continued use of cranberry juice and Azo products.  Empirically we will start her on some Cipro but get a referral to urology for cystoscopy and bladder manometrics  - Urinalysis-UC if Indicated  - Ambulatory referral to Urology  - ciprofloxacin HCl (CIPRO) 500 MG tablet; Take 1 tablet (500 mg total) by mouth 2 (two) times a day for 10 days.  Dispense: 20 tablet; Refill: 0      Subjective:   Elaine has been experiencing intermittent UTIs on and off for the last 2 to 3 years which are more frequent in nature often relieved by ciprofloxacin.  Apparently her mother has had the same problem most of her life.  At this time she sometimes viral as her mother is ciprofloxacin and gets relief after 2 to 3 days of use.  Is been 2 to 3 months since she has had a recurrence.  Based on her story and her inability to give us a sample today I will give her her own prescription to use until we can get a urological consultation.  Patient needs cystoscopy and manometrics and inspection of her bladder for diverticula etc.  We will follow along here in primary care.  She also has a new partner as her marriage did not work out and her new partner has a history of HPV so she is due for repeat HPV Pap smear within a year.  All medical questions asked were answered chart was reviewed today.    Review of Systems: A complete 14 point review of systems was obtained and is negative or as stated in the history of present illness.    Past Medical History:   Diagnosis Date     Environmental allergies      Family History   Problem  Relation Age of Onset     No Medical Problems Mother      Arthritis Maternal Grandmother      Diabetes Maternal Grandmother      Kidney disease Maternal Grandmother         going to have kidney transplant     Diabetes Paternal Grandmother      Diabetes Paternal Grandfather      Vision loss Paternal Grandfather      No Medical Problems Sister      Vision loss Maternal Grandfather      Past Surgical History:   Procedure Laterality Date     TONSILLECTOMY AND ADENOIDECTOMY       Social History     Tobacco Use     Smoking status: Never Smoker     Smokeless tobacco: Never Used   Substance Use Topics     Alcohol use: Yes     Alcohol/week: 1.0 standard drinks     Types: 1 Glasses of wine per week     Comment: Socially     Drug use: No         Objective:   BP 96/58 (Patient Site: Right Arm, Cuff Size: Adult Regular)   Pulse 89   Wt 131 lb 6.4 oz (59.6 kg)   LMP 05/29/2021   SpO2 99%   BMI 21.21 kg/m      General Appearance:  Normal  Head:  Normal  Ears: Normal  Eyes:  Normal  Nose:  Normal  Throat:  Normal  Neck:  Normal  Back:  Normal  Chest/Breast:Normal  Lungs:  Normal  Heart:  Normal  Abdomen:  Normal  Musculoskeletal:  Normal  Lymphatic:  Normal  Skin/Hair/Nails:  Normal  Neurologic:  Normal  Extremities:  Normal  Genitourinary: Normal  Pulses:  Normal           This note has been dictated using voice recognition software. Any grammatical or context distortions are unintentional and inherent to the the software.

## 2021-06-28 ENCOUNTER — OFFICE VISIT - HEALTHEAST (OUTPATIENT)
Dept: FAMILY MEDICINE | Facility: CLINIC | Age: 31
End: 2021-06-28

## 2021-06-28 DIAGNOSIS — S82.202A TIBIA/FIBULA FRACTURE, LEFT, CLOSED, INITIAL ENCOUNTER: ICD-10-CM

## 2021-06-28 DIAGNOSIS — S82.402A TIBIA/FIBULA FRACTURE, LEFT, CLOSED, INITIAL ENCOUNTER: ICD-10-CM

## 2021-07-04 NOTE — ADDENDUM NOTE
Addendum Note by Vale Sweet CMA at 6/14/2021 12:20 PM     Author: Vale Sweet CMA Service: -- Author Type: Certified Medical Assistant    Filed: 6/14/2021 12:58 PM Encounter Date: 6/14/2021 Status: Signed    : Vale Sweet CMA (Certified Medical Assistant)    Addended by: BAILEY SWEET on: 6/14/2021 12:58 PM        Modules accepted: Orders

## 2021-07-06 VITALS
SYSTOLIC BLOOD PRESSURE: 96 MMHG | DIASTOLIC BLOOD PRESSURE: 58 MMHG | OXYGEN SATURATION: 99 % | BODY MASS INDEX: 21.21 KG/M2 | WEIGHT: 131.4 LBS | HEART RATE: 89 BPM

## 2021-07-06 VITALS
HEART RATE: 90 BPM | BODY MASS INDEX: 21.29 KG/M2 | WEIGHT: 131.9 LBS | OXYGEN SATURATION: 99 % | DIASTOLIC BLOOD PRESSURE: 76 MMHG | SYSTOLIC BLOOD PRESSURE: 110 MMHG
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
## 2021-07-20 DIAGNOSIS — Z78.9 USES BIRTH CONTROL: Primary | ICD-10-CM

## 2021-07-24 RX ORDER — DROSPIRENONE AND ETHINYL ESTRADIOL 0.02-3(28)
KIT ORAL
Qty: 84 TABLET | Refills: 3 | Status: SHIPPED | OUTPATIENT
Start: 2021-07-24 | End: 2021-07-26

## 2021-07-25 NOTE — TELEPHONE ENCOUNTER
"Last Written Prescription Date:  11/8/2020  Last Fill Quantity: 84,  # refills: 2   Last office visit provider:  6/28/2021 Dr. Perera     Protocol passed - is on active med list in HE epic:  drospirenone-ethinyl estradioL (GIANVI, 28,) 3-0.02 mg per tablet 84 tablet 2 11/8/2020  No   Sig: TAKE ONE TABLET BY MOUTH ONCE DAILY. NO FURTHER REFILLS UNTIL SEEN   Sent to pharmacy as: drospirenone 3 mg-ethinyl estradioL 0.02 mg tablet (Gianvi (28))   E-Prescribing Status: Receipt confirmed by pharmacy (11/8/2020  3:16 PM CST         Requested Prescriptions   Pending Prescriptions Disp Refills     GIANVI 3-0.02 MG tablet [Pharmacy Med Name: GIANVI 3 MG-0.02 MG TABLET] 84 tablet 2     Sig: TAKE ONE TABLET BY MOUTH ONCE DAILY. NO FURTHER REFILLS UNTIL SEEN       Contraceptives Protocol Failed - 7/20/2021 10:03 AM        Failed - Medication is active on med list        Passed - Patient is not a current smoker if age is 35 or older        Passed - Recent (12 mo) or future (30 days) visit within the authorizing provider's specialty     Patient has had an office visit with the authorizing provider or a provider within the authorizing providers department within the previous 12 mos or has a future within next 30 days. See \"Patient Info\" tab in inbasket, or \"Choose Columns\" in Meds & Orders section of the refill encounter.              Passed - No active pregnancy on record        Passed - No positive pregnancy test in past 12 months             Christina Han RN 07/24/21 8:02 PM  "

## 2021-07-26 DIAGNOSIS — Z78.9 USES BIRTH CONTROL: ICD-10-CM

## 2021-07-29 RX ORDER — DROSPIRENONE AND ETHINYL ESTRADIOL 0.02-3(28)
KIT ORAL
Qty: 84 TABLET | Refills: 1 | Status: SHIPPED | OUTPATIENT
Start: 2021-07-29 | End: 2022-04-01

## 2021-07-30 NOTE — TELEPHONE ENCOUNTER
"  Disp Refills Start End RULA    drospirenone-ethinyl estradioL (GIANVI, 28,) 3-0.02 mg per tablet 84 tablet 2 11/8/2020  No   Sig: TAKE ONE TABLET BY MOUTH ONCE DAILY. NO FURTHER REFILLS UNTIL SEEN   Sent to pharmacy as: drospirenone 3 mg-ethinyl estradioL 0.02 mg tablet (Gianvi (28))   E-Prescribing Status: Receipt confirmed by pharmacy (11/8/2020  3:16 PM CST)     Last Written Prescription Date:  11/08/2020  Last Fill Quantity: 84,  # refills: 2   Last office visit provider:  06/28/2021 with Dr Perera.    Requested Prescriptions   Pending Prescriptions Disp Refills     drospirenone-ethinyl estradiol (GIANVI) 3-0.02 MG tablet 84 tablet 3     Sig: TAKE ONE TABLET BY MOUTH ONCE DAILY. NO FURTHER REFILLS UNTIL SEEN       Contraceptives Protocol Passed - 7/26/2021 10:38 AM        Passed - Patient is not a current smoker if age is 35 or older        Passed - Recent (12 mo) or future (30 days) visit within the authorizing provider's specialty     Patient has had an office visit with the authorizing provider or a provider within the authorizing providers department within the previous 12 mos or has a future within next 30 days. See \"Patient Info\" tab in inbasket, or \"Choose Columns\" in Meds & Orders section of the refill encounter.              Passed - Medication is active on med list        Passed - No active pregnancy on record        Passed - No positive pregnancy test in past 12 months             Marla Vega 07/29/21 9:06 PM  "

## 2021-10-24 ENCOUNTER — HEALTH MAINTENANCE LETTER (OUTPATIENT)
Age: 31
End: 2021-10-24

## 2021-11-02 RX ORDER — GABAPENTIN 100 MG/1
CAPSULE ORAL
COMMUNITY
Start: 2021-07-20 | End: 2021-11-04

## 2021-11-04 ENCOUNTER — OFFICE VISIT (OUTPATIENT)
Dept: FAMILY MEDICINE | Facility: CLINIC | Age: 31
End: 2021-11-04
Payer: COMMERCIAL

## 2021-11-04 VITALS
HEART RATE: 80 BPM | WEIGHT: 144.4 LBS | SYSTOLIC BLOOD PRESSURE: 98 MMHG | OXYGEN SATURATION: 98 % | HEIGHT: 66 IN | BODY MASS INDEX: 23.21 KG/M2 | RESPIRATION RATE: 20 BRPM | TEMPERATURE: 98.1 F | DIASTOLIC BLOOD PRESSURE: 68 MMHG

## 2021-11-04 DIAGNOSIS — N39.0 URINARY TRACT INFECTION WITHOUT HEMATURIA, SITE UNSPECIFIED: ICD-10-CM

## 2021-11-04 DIAGNOSIS — S43.431A LABRAL TEAR OF SHOULDER, RIGHT, INITIAL ENCOUNTER: Primary | ICD-10-CM

## 2021-11-04 LAB
ERYTHROCYTE [DISTWIDTH] IN BLOOD BY AUTOMATED COUNT: 13.4 % (ref 10–15)
HCT VFR BLD AUTO: 41.3 % (ref 35–47)
HGB BLD-MCNC: 13.7 G/DL (ref 11.7–15.7)
MCH RBC QN AUTO: 28.3 PG (ref 26.5–33)
MCHC RBC AUTO-ENTMCNC: 33.2 G/DL (ref 31.5–36.5)
MCV RBC AUTO: 85 FL (ref 78–100)
PLATELET # BLD AUTO: 393 10E3/UL (ref 150–450)
POTASSIUM BLD-SCNC: 4.6 MMOL/L (ref 3.5–5)
RBC # BLD AUTO: 4.84 10E6/UL (ref 3.8–5.2)
WBC # BLD AUTO: 9.6 10E3/UL (ref 4–11)

## 2021-11-04 PROCEDURE — 36415 COLL VENOUS BLD VENIPUNCTURE: CPT | Performed by: FAMILY MEDICINE

## 2021-11-04 PROCEDURE — 99214 OFFICE O/P EST MOD 30 MIN: CPT | Performed by: FAMILY MEDICINE

## 2021-11-04 PROCEDURE — 85027 COMPLETE CBC AUTOMATED: CPT | Performed by: FAMILY MEDICINE

## 2021-11-04 PROCEDURE — 84132 ASSAY OF SERUM POTASSIUM: CPT | Performed by: FAMILY MEDICINE

## 2021-11-04 RX ORDER — OXYCODONE HYDROCHLORIDE 5 MG/1
TABLET ORAL
COMMUNITY
Start: 2021-07-27 | End: 2021-11-04

## 2021-11-04 ASSESSMENT — MIFFLIN-ST. JEOR: SCORE: 1381.74
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
## 2021-11-22 ENCOUNTER — VIRTUAL VISIT (OUTPATIENT)
Dept: UROLOGY | Facility: CLINIC | Age: 31
End: 2021-11-22
Attending: FAMILY MEDICINE
Payer: COMMERCIAL

## 2021-11-22 VITALS — WEIGHT: 136 LBS | BODY MASS INDEX: 21.86 KG/M2 | HEIGHT: 66 IN

## 2021-11-22 DIAGNOSIS — N39.0 RECURRENT UTI: Primary | ICD-10-CM

## 2021-11-22 DIAGNOSIS — Z84.2 FAMILY HISTORY OF UTI (URINARY TRACT INFECTION): ICD-10-CM

## 2021-11-22 PROCEDURE — 99204 OFFICE O/P NEW MOD 45 MIN: CPT | Mod: GT | Performed by: PHYSICIAN ASSISTANT

## 2021-11-22 ASSESSMENT — ENCOUNTER SYMPTOMS
CHILLS: 0
FEVER: 0
FREQUENCY: 0
VOMITING: 0
NAUSEA: 0
SHORTNESS OF BREATH: 0
DYSURIA: 1
HEMATURIA: 0

## 2021-11-22 ASSESSMENT — MIFFLIN-ST. JEOR: SCORE: 1343.64

## 2021-11-22 ASSESSMENT — PAIN SCALES - GENERAL: PAINLEVEL: NO PAIN (0)

## 2021-11-22 NOTE — PATIENT INSTRUCTIONS
1. Urinalysis and post void residual  2. Mycoplasma, Ureaplasma cultures, and urine culture  3. CT scan Please call [phone] to schedule this at [facility] or [facility]. This should be done 1-2 days prior to your appointment with the urologist  4. Cystoscopy to look inside the bladder.  5. Hydrate with water to keep the urine dilute.   6. Lifestyle and hygiene modifications: wiping front to back, wearing cotton breathable underwear, voiding before and after intercourse to flush the urethra, minimizing baths and opting for showers, and appropriate perineal hygiene.   7.  Continue with cranberry pills, as this seems to help you.  8. Below is a list of things that can irritate the bladder and should try to remove to see if it improves your symptoms:       Caffeinated soft drinks.    Coffee.    Tea.    Chocolate.    Tomato-based foods.    Acidic juices and fruits. (includes cranberry juice)    Alcohol.    Carbonated drinks.    Aspartame/Nutrasweet (artificial sweeteners)    Vitamin C supplements and citrus fruit    Spicy food  9.  Get a urine culture when you have symptoms of a UTI.  You can contact our office to arrange this.

## 2021-11-22 NOTE — PROGRESS NOTES
Elaine is a 31 year old who is being evaluated via a billable video visit.      How would you like to obtain your AVS? MyChart  If the video visit is dropped, the invitation should be resent by: Text to cell phone: [phone]  Will anyone else be joining your video visit? No      Video-Visit Details    Type of service:  Video Visit    Video Start Time: 1435    Video End Time:1454    Originating Location (pt. Location): Home    Distant Location (provider location):  [facility] [place]     Platform used for Video Visit: Ovi     CC: Recurrent UTIs    HPI: Ms. Brooks is a very pleasant 31 year old year old female seen in consultation today for recurrent UTIs. She is not immunosuppressed.  She  notes that she has had increasing urinary tract infections over the last several years.  T she would estimate that she has approximately 4 to 5/year.  Symptoms typically include dysuria and urinary urgency.  These resolved with antibiotic treatment.  She has not had recent cultures.  She does have a history of E. coli urinary tract infections.  She believes that she had a first UTI as a kid and also have them in her teens.  She is not diabetic.     No history of nephrolithiasis.  She cannot correlate these to sexual intercourse.    Fluid intake typically includes water, tea, and energy drinks.    She has tried cranberry pills daily and increasing her water intake.  She feels that both of these of helped her symptoms.  She also tries to urinate after intercourse.    She does feel like she always has some low-grade degree of dysuria.  Her bowel movements vary.  At times she can go several days between them.  She is still menstruating.  Patient denies hematuria, current urgency, frequency, urinary tract infections.    Family history of recurrent UTIs in her mother has undergone urethral stretching 2 times.  She is on postcoital antibiotics.    No history of pyelonephritis or hospitalization.  She  "would like to try to avoid recurrent antibiotics.    The following portions of the patient's history were reviewed and updated as appropriate: allergies, current medications, past family history, past medical history, past social history, past surgical history and problem list.     Review of Systems   Constitutional: Negative for chills and fever.   Respiratory: Negative for shortness of breath.    Cardiovascular: Negative for chest pain.   Gastrointestinal: Negative for nausea and vomiting.   Genitourinary: Positive for dysuria. Negative for frequency, hematuria and urgency.        Patient Active Problem List   Diagnosis     Pap Smear Vag w ASC-US - 11/2009      LSIL on pap 9/12/2008      CARDIOVASCULAR SCREENING; LDL GOAL LESS THAN 160     Thyroid function test abnormal     Controlled substance agreement signed     Encounter for screening for cardiovascular disorders     Cervical dysplasia     Past Medical History:   Diagnosis Date     Abnormal Pap smear 9/08, 11/09    colp 1/10 LINDA I     ASCUS favor benign 2/2014    Neg HPV, pap in 3 years     Controlled substance agreement signed 9/13/2014     Environmental allergies      Past Surgical History:   Procedure Laterality Date     HC REMOVE TONSILS/ADENOIDS,<13 Y/O  1994    T & A <11y.o.     LEG SURGERY       SHOULDER SURGERY       TONSILLECTOMY & ADENOIDECTOMY       Social History:   Non smoker.  Single.    Family History:  Family history of recurrent or chronic urinary tract infections in her mother.    GENERAL PHYSICAL EXAM:   Vitals: Ht 1.676 m (5' 6\")   Wt 61.7 kg (136 lb)   BMI 21.95 kg/m    GENERAL: Healthy, alert and no distress  EYES: Eyes grossly normal to inspection.  No discharge or erythema, or obvious scleral/conjunctival abnormalities.  HENT: Normal cephalic/atraumatic.  External ears, nose and mouth without ulcers or lesions.  No nasal drainage visible.  NECK: No asymmetry, visible masses or scars  RESP: No audible wheeze, cough, or visible " cyanosis.  No visible retractions or increased work of breathing.    MS: Right upper extremity in a shoulder immobilizer.  SKIN: Visible skin clear. No significant rash, abnormal pigmentation or lesions.  NEURO: Cranial nerves grossly intact.  Mentation and speech appropriate for age.  PSYCH: Mentation appears normal, affect normal/bright, judgement and insight intact, normal speech and appearance well-groomed.    Urine -   Recent Labs   Lab Test 11/11/19  0954   COLOR Yellow   APPEARANCE Clear   URINEGLC Negative   URINEBILI Negative   URINEKETONE Negative   SG 1.020   UBLD Trace*   URINEPH 7.5   PROTEIN Negative   UROBILINOGEN 0.2 E.U./dL   NITRITE Negative   LEUKEST Negative   RBCU 0-2   WBCU None Seen       ASSESSMENT:   1. Recurrent UTI    2. Family history of UTI (urinary tract infection)      I had pleasure today meeting with Ms. Brooks to discuss her recurrent urinary tract infections.  We discussed that she is at high risk for these given her family history, being female, and having previous infections.    Urinary tract infections are 2nd to the common cold and causing symptoms in women. The fact that you have urinary tract infections does not imply that there is anything wrong with you or that you are causing them with bad hygiene. We now know that there is strong family history for urinary tract infections due to some tissue markers allow for adherence of bacteria to the bladder lining. In addition we also know that urinary tract infections tend to cluster together. That means that the most likely time to get one is right after you have cleared one.    We will often do an anatomic evaluation for recurrent urinary tract infections which will typically include CT urogram and cystoscopy (scope with your bladder).  If concern for pyelonephritis, we often will do VCUG.  We discussed the anatomic evaluation often does not find any cause for urinary tract infections.  We may be able to reduce urinary tract  infections, but we are unable to cure them.    PLAN:   UA/UC  PVR  Mycoplasma, Ureaplasma  CT Urogram  Cysto with first available urologist  Bladder irritant list provided  Hydrate.  - Lifestyle and hygiene modifications were reviewed today in clinic, including wiping front to back, wearing cotton breathable underwear, voiding before and after intercourse to flush the urethra, minimizing baths and opting for showers, and appropriate perineal hygiene. Push fluids to keep the urine dilute  -Continue with hydration and cranberry pills, as these seem to help her symptomatology.    If cultures are positive, will treat with culture specific antibiotics.    Possible treatments in the future:   Hiprex 1000mg BID  Vitamin C 1000mg BID  Macrobid 100 mg post coital  Probiotics  Bladder instillations    Have also recommended that if she has symptoms of urinary tract infection that she should get a urine culture as this can help us guide treatment and recommendations in the future.    Lizzy Villanueva PA-C  [facility]   [phone]

## 2021-11-22 NOTE — LETTER
11/22/2021       RE: Elaine Brooks  [id number] [facility] [address]     Dear Colleague,    Thank you for referring your patient, Elaine Brooks, to the [facility] [place] at [facility]. Please see a copy of my visit note below.    Elaine is a 31 year old who is being evaluated via a billable video visit.      How would you like to obtain your AVS? MyChart  If the video visit is dropped, the invitation should be resent by: Text to cell phone: [phone]  Will anyone else be joining your video visit? No      Video-Visit Details    Type of service:  Video Visit    Video Start Time: 1435    Video End Time:1454    Originating Location (pt. Location): Home    Distant Location (provider location):  [facility] [place]     Platform used for Video Visit: Ovi     CC: Recurrent UTIs    HPI: Ms. Brooks is a very pleasant 31 year old year old female seen in consultation today for recurrent UTIs. She is not immunosuppressed.  She  notes that she has had increasing urinary tract infections over the last several years.  T she would estimate that she has approximately 4 to 5/year.  Symptoms typically include dysuria and urinary urgency.  These resolved with antibiotic treatment.  She has not had recent cultures.  She does have a history of E. coli urinary tract infections.  She believes that she had a first UTI as a kid and also have them in her teens.  She is not diabetic.     No history of nephrolithiasis.  She cannot correlate these to sexual intercourse.    Fluid intake typically includes water, tea, and energy drinks.    She has tried cranberry pills daily and increasing her water intake.  She feels that both of these of helped her symptoms.  She also tries to urinate after intercourse.    She does feel like she always has some low-grade degree of dysuria.  Her bowel movements vary.  At times she can go several  "days between them.  She is still menstruating.  Patient denies hematuria, current urgency, frequency, urinary tract infections.    Family history of recurrent UTIs in her mother has undergone urethral stretching 2 times.  She is on postcoital antibiotics.    No history of pyelonephritis or hospitalization.  She would like to try to avoid recurrent antibiotics.    The following portions of the patient's history were reviewed and updated as appropriate: allergies, current medications, past family history, past medical history, past social history, past surgical history and problem list.     Review of Systems   Constitutional: Negative for chills and fever.   Respiratory: Negative for shortness of breath.    Cardiovascular: Negative for chest pain.   Gastrointestinal: Negative for nausea and vomiting.   Genitourinary: Positive for dysuria. Negative for frequency, hematuria and urgency.        Patient Active Problem List   Diagnosis     Pap Smear Vag w ASC-US - 11/2009      LSIL on pap 9/12/2008      CARDIOVASCULAR SCREENING; LDL GOAL LESS THAN 160     Thyroid function test abnormal     Controlled substance agreement signed     Encounter for screening for cardiovascular disorders     Cervical dysplasia     Past Medical History:   Diagnosis Date     Abnormal Pap smear 9/08, 11/09    colp 1/10 LINDA I     ASCUS favor benign 2/2014    Neg HPV, pap in 3 years     Controlled substance agreement signed 9/13/2014     Environmental allergies      Past Surgical History:   Procedure Laterality Date     HC REMOVE TONSILS/ADENOIDS,<11 Y/O  1994    T & A <11y.o.     LEG SURGERY       SHOULDER SURGERY       TONSILLECTOMY & ADENOIDECTOMY       Social History:   Non smoker.  Single.    Family History:  Family history of recurrent or chronic urinary tract infections in her mother.    GENERAL PHYSICAL EXAM:   Vitals: Ht 1.676 m (5' 6\")   Wt 61.7 kg (136 lb)   BMI 21.95 kg/m    GENERAL: Healthy, alert and no distress  EYES: Eyes grossly " normal to inspection.  No discharge or erythema, or obvious scleral/conjunctival abnormalities.  HENT: Normal cephalic/atraumatic.  External ears, nose and mouth without ulcers or lesions.  No nasal drainage visible.  NECK: No asymmetry, visible masses or scars  RESP: No audible wheeze, cough, or visible cyanosis.  No visible retractions or increased work of breathing.    MS: Right upper extremity in a shoulder immobilizer.  SKIN: Visible skin clear. No significant rash, abnormal pigmentation or lesions.  NEURO: Cranial nerves grossly intact.  Mentation and speech appropriate for age.  PSYCH: Mentation appears normal, affect normal/bright, judgement and insight intact, normal speech and appearance well-groomed.    Urine -   Recent Labs   Lab Test 11/11/19  0954   COLOR Yellow   APPEARANCE Clear   URINEGLC Negative   URINEBILI Negative   URINEKETONE Negative   SG 1.020   UBLD Trace*   URINEPH 7.5   PROTEIN Negative   UROBILINOGEN 0.2 E.U./dL   NITRITE Negative   LEUKEST Negative   RBCU 0-2   WBCU None Seen       ASSESSMENT:   1. Recurrent UTI    2. Family history of UTI (urinary tract infection)      I had pleasure today meeting with Ms. Brooks to discuss her recurrent urinary tract infections.  We discussed that she is at high risk for these given her family history, being female, and having previous infections.    Urinary tract infections are 2nd to the common cold and causing symptoms in women. The fact that you have urinary tract infections does not imply that there is anything wrong with you or that you are causing them with bad hygiene. We now know that there is strong family history for urinary tract infections due to some tissue markers allow for adherence of bacteria to the bladder lining. In addition we also know that urinary tract infections tend to cluster together. That means that the most likely time to get one is right after you have cleared one.    We will often do an anatomic evaluation for  recurrent urinary tract infections which will typically include CT urogram and cystoscopy (scope with your bladder).  If concern for pyelonephritis, we often will do VCUG.  We discussed the anatomic evaluation often does not find any cause for urinary tract infections.  We may be able to reduce urinary tract infections, but we are unable to cure them.    PLAN:   UA/UC  PVR  Mycoplasma, Ureaplasma  CT Urogram  Cysto with first available urologist  Bladder irritant list provided  Hydrate.  - Lifestyle and hygiene modifications were reviewed today in clinic, including wiping front to back, wearing cotton breathable underwear, voiding before and after intercourse to flush the urethra, minimizing baths and opting for showers, and appropriate perineal hygiene. Push fluids to keep the urine dilute  -Continue with hydration and cranberry pills, as these seem to help her symptomatology.    If cultures are positive, will treat with culture specific antibiotics.    Possible treatments in the future:   Hiprex 1000mg BID  Vitamin C 1000mg BID  Macrobid 100 mg post coital  Probiotics  Bladder instillations    Have also recommended that if she has symptoms of urinary tract infection that she should get a urine culture as this can help us guide treatment and recommendations in the future.    Lizzy Villanueva PA-C  [facility]   [phone]

## 2022-01-03 ENCOUNTER — TELEPHONE (OUTPATIENT)
Dept: UROLOGY | Facility: CLINIC | Age: 32
End: 2022-01-03
Payer: COMMERCIAL

## 2022-01-03 DIAGNOSIS — N39.0 RECURRENT UTI: Primary | ICD-10-CM

## 2022-01-03 NOTE — TELEPHONE ENCOUNTER
[facility] Call Center    Phone Message    May a detailed message be left on voicemail: yes     Reason for Call: Order(s): Other:   Reason for requested: CT needed prior to Cysto  Date needed: Before 01/13/22  Provider name: Lizzy Villanueva    Pt called stating she tried to schedule her CT as requested by office but no orders in chart. Please follow up with pt once placed.     Action Taken: Message routed to:  Other: [facility]    Travel Screening: Not Applicable

## 2022-01-03 NOTE — TELEPHONE ENCOUNTER
Called patient and apologized that I didn't catch the order was not already placed. Informed patient that order for CT is now in the computer. Other orders also placed in computer for nurse visit on Wednesday per PA's office note.     Kary Cornejo LPN

## 2022-01-03 NOTE — TELEPHONE ENCOUNTER
Returned phone call and spoke with patient. She wanted to know about order of things on AVS. Explained that Urine tests and PVR should be done prior with a nurse. Also mentioned that CT needs to be before Cystoscopy 1-2 days, patient expressed understanding and nurse visit made for this week.     Kary Cornejo LPN

## 2022-01-03 NOTE — TELEPHONE ENCOUNTER
[facility] Call Center    Phone Message    May a detailed message be left on voicemail: yes     Reason for Call: Video appointment on 11/22/21 with Lizzy.  Has some follow up questions. Please reach out to patient.    Action Taken: Message routed to:  [facility]rology    Travel Screening: Not Applicable

## 2022-01-05 ENCOUNTER — OFFICE VISIT (OUTPATIENT)
Dept: UROLOGY | Facility: CLINIC | Age: 32
End: 2022-01-05
Payer: COMMERCIAL

## 2022-01-05 DIAGNOSIS — N39.0 RECURRENT UTI: Primary | ICD-10-CM

## 2022-01-05 LAB
ALBUMIN UR-MCNC: NEGATIVE MG/DL
APPEARANCE UR: CLEAR
BILIRUB UR QL STRIP: NEGATIVE
COLOR UR AUTO: YELLOW
GLUCOSE UR STRIP-MCNC: NEGATIVE MG/DL
HGB UR QL STRIP: NEGATIVE
KETONES UR STRIP-MCNC: NEGATIVE MG/DL
LEUKOCYTE ESTERASE UR QL STRIP: NEGATIVE
NITRATE UR QL: NEGATIVE
PH UR STRIP: 7.5 [PH] (ref 5–7)
RESIDUAL VOLUME (RV) (EXTERNAL): 80
SP GR UR STRIP: 1.02 (ref 1–1.03)
UROBILINOGEN UR STRIP-ACNC: 0.2 E.U./DL

## 2022-01-05 PROCEDURE — 87076 CULTURE ANAEROBE IDENT EACH: CPT

## 2022-01-05 PROCEDURE — 51798 US URINE CAPACITY MEASURE: CPT

## 2022-01-05 PROCEDURE — 87088 URINE BACTERIA CULTURE: CPT

## 2022-01-05 PROCEDURE — 87186 SC STD MICRODIL/AGAR DIL: CPT

## 2022-01-05 PROCEDURE — 87086 URINE CULTURE/COLONY COUNT: CPT

## 2022-01-05 PROCEDURE — 81003 URINALYSIS AUTO W/O SCOPE: CPT | Mod: QW | Performed by: PHYSICIAN ASSISTANT

## 2022-01-05 PROCEDURE — 87109 MYCOPLASMA: CPT

## 2022-01-07 LAB — BACTERIA UR CULT: ABNORMAL

## 2022-01-11 ENCOUNTER — LAB (OUTPATIENT)
Dept: LAB | Facility: CLINIC | Age: 32
End: 2022-01-11
Payer: COMMERCIAL

## 2022-01-11 ENCOUNTER — HOSPITAL ENCOUNTER (OUTPATIENT)
Dept: CT IMAGING | Facility: CLINIC | Age: 32
Discharge: HOME | End: 2022-01-11
Attending: PHYSICIAN ASSISTANT | Admitting: PHYSICIAN ASSISTANT
Payer: COMMERCIAL

## 2022-01-11 DIAGNOSIS — R30.0 DYSURIA: ICD-10-CM

## 2022-01-11 DIAGNOSIS — N39.0 RECURRENT UTI: ICD-10-CM

## 2022-01-11 LAB
ALBUMIN UR-MCNC: NEGATIVE MG/DL
APPEARANCE UR: CLEAR
BILIRUB UR QL STRIP: NEGATIVE
COLOR UR AUTO: YELLOW
GLUCOSE UR STRIP-MCNC: NEGATIVE MG/DL
HGB UR QL STRIP: ABNORMAL
KETONES UR STRIP-MCNC: NEGATIVE MG/DL
LEUKOCYTE ESTERASE UR QL STRIP: NEGATIVE
NITRATE UR QL: NEGATIVE
PH UR STRIP: 5.5 [PH] (ref 5–8)
SP GR UR STRIP: 1.02 (ref 1–1.03)
UROBILINOGEN UR STRIP-ACNC: 0.2 E.U./DL

## 2022-01-11 PROCEDURE — 250N000009 HC RX 250: Performed by: PHYSICIAN ASSISTANT

## 2022-01-11 PROCEDURE — 81003 URINALYSIS AUTO W/O SCOPE: CPT | Mod: QW

## 2022-01-11 PROCEDURE — 74178 CT ABD&PLV WO CNTR FLWD CNTR: CPT

## 2022-01-11 PROCEDURE — 87086 URINE CULTURE/COLONY COUNT: CPT

## 2022-01-11 PROCEDURE — 250N000011 HC RX IP 250 OP 636: Performed by: PHYSICIAN ASSISTANT

## 2022-01-11 RX ORDER — IOPAMIDOL 755 MG/ML
500 INJECTION, SOLUTION INTRAVASCULAR ONCE
Status: COMPLETED | OUTPATIENT
Start: 2022-01-11 | End: 2022-01-11

## 2022-01-11 RX ADMIN — SODIUM CHLORIDE 95 ML: 9 INJECTION, SOLUTION INTRAVENOUS at 08:45

## 2022-01-11 RX ADMIN — IOPAMIDOL 120 ML: 755 INJECTION, SOLUTION INTRAVENOUS at 08:45

## 2022-01-12 DIAGNOSIS — N39.0 RECURRENT UTI: Primary | ICD-10-CM

## 2022-01-12 DIAGNOSIS — N39.0 URINARY TRACT INFECTION: Primary | ICD-10-CM

## 2022-01-12 DIAGNOSIS — N34.1 NGU DUE TO UREAPLASMA UREALYTICUM: ICD-10-CM

## 2022-01-12 DIAGNOSIS — A49.3 NGU DUE TO UREAPLASMA UREALYTICUM: ICD-10-CM

## 2022-01-12 LAB
BACTERIA UR CULT: ABNORMAL
BACTERIA UR CULT: NO GROWTH

## 2022-01-12 RX ORDER — NITROFURANTOIN 25; 75 MG/1; MG/1
100 CAPSULE ORAL 2 TIMES DAILY
Qty: 14 CAPSULE | Refills: 0 | Status: SHIPPED | OUTPATIENT
Start: 2022-01-12 | End: 2022-07-26

## 2022-01-12 RX ORDER — DOXYCYCLINE HYCLATE 100 MG
100 TABLET ORAL 2 TIMES DAILY
Qty: 20 TABLET | Refills: 0 | Status: SHIPPED | OUTPATIENT
Start: 2022-01-12 | End: 2022-01-22

## 2022-01-19 ENCOUNTER — TELEPHONE (OUTPATIENT)
Dept: UROLOGY | Facility: CLINIC | Age: 32
End: 2022-01-19
Payer: COMMERCIAL

## 2022-01-25 DIAGNOSIS — N39.0 URINARY TRACT INFECTION: Primary | ICD-10-CM

## 2022-01-27 ENCOUNTER — OFFICE VISIT (OUTPATIENT)
Dept: UROLOGY | Facility: CLINIC | Age: 32
End: 2022-01-27
Payer: COMMERCIAL

## 2022-01-27 VITALS
BODY MASS INDEX: 23.46 KG/M2 | WEIGHT: 146 LBS | HEIGHT: 66 IN | SYSTOLIC BLOOD PRESSURE: 102 MMHG | DIASTOLIC BLOOD PRESSURE: 68 MMHG

## 2022-01-27 DIAGNOSIS — A49.3 NGU DUE TO UREAPLASMA UREALYTICUM: ICD-10-CM

## 2022-01-27 DIAGNOSIS — N34.1 NGU DUE TO UREAPLASMA UREALYTICUM: ICD-10-CM

## 2022-01-27 DIAGNOSIS — N39.0 RECURRENT UTI: Primary | ICD-10-CM

## 2022-01-27 LAB
ALBUMIN UR-MCNC: NEGATIVE MG/DL
APPEARANCE UR: CLEAR
BILIRUB UR QL STRIP: NEGATIVE
COLOR UR AUTO: YELLOW
GLUCOSE UR STRIP-MCNC: NEGATIVE MG/DL
HGB UR QL STRIP: ABNORMAL
KETONES UR STRIP-MCNC: NEGATIVE MG/DL
LEUKOCYTE ESTERASE UR QL STRIP: NEGATIVE
NITRATE UR QL: NEGATIVE
PH UR STRIP: 6.5 [PH] (ref 5–7)
RESIDUAL VOLUME (RV) (EXTERNAL): 43
SP GR UR STRIP: 1.02 (ref 1–1.03)
UROBILINOGEN UR STRIP-ACNC: 0.2 E.U./DL

## 2022-01-27 PROCEDURE — 51798 US URINE CAPACITY MEASURE: CPT | Performed by: STUDENT IN AN ORGANIZED HEALTH CARE EDUCATION/TRAINING PROGRAM

## 2022-01-27 PROCEDURE — 81003 URINALYSIS AUTO W/O SCOPE: CPT | Mod: QW | Performed by: STUDENT IN AN ORGANIZED HEALTH CARE EDUCATION/TRAINING PROGRAM

## 2022-01-27 PROCEDURE — 52000 CYSTOURETHROSCOPY: CPT | Performed by: STUDENT IN AN ORGANIZED HEALTH CARE EDUCATION/TRAINING PROGRAM

## 2022-01-27 PROCEDURE — 99213 OFFICE O/P EST LOW 20 MIN: CPT | Mod: 25 | Performed by: STUDENT IN AN ORGANIZED HEALTH CARE EDUCATION/TRAINING PROGRAM

## 2022-01-27 RX ORDER — LORATADINE 10 MG/1
10 TABLET ORAL DAILY
COMMUNITY
End: 2022-07-26

## 2022-01-27 RX ORDER — PYRIDOXINE HCL (VITAMIN B6) 100 MG
1 TABLET ORAL DAILY
COMMUNITY
End: 2023-09-05

## 2022-01-27 ASSESSMENT — PAIN SCALES - GENERAL: PAINLEVEL: NO PAIN (0)

## 2022-01-27 ASSESSMENT — MIFFLIN-ST. JEOR: SCORE: 1389

## 2022-01-27 NOTE — NURSING NOTE
Chief Complaint   Patient presents with     Recurrent UTI     Pt here for in office cystoscopy     Prior to the start of the procedure and with procedural staff participation, I verbally confirmed the patient s identity using two indicators, relevant allergies, that the procedure was appropriate and matched the consent or emergent situation, and that the correct equipment/implants were available. Immediately prior to starting the procedure I conducted the Time Out with the procedural staff and re-confirmed the patient s name, procedure, and site/side. I have wiped the patient off with the povidone-Iodine solution, draped them,  used Lidocaine hydrochloride jelly, and instilled sterile water into the bladder. (The Joint Commission universal protocol was followed.)  Yes    Sedation (Moderate or Deep): None    Bebe Brown CMA

## 2022-01-27 NOTE — PROGRESS NOTES
"CHIEF COMPLAINT   Elaine Brooks who is a 31 year old female returns today for follow-up of recurrent UTI.      HPI   Elaine Brooks is a 31 year old female who presents with a history of recurrent UTI.  Seen initially 11/22/2021 by Lizzy Villanueva PA-C, see note for full details    Was + for ureaplasma 1/5/2022 and also enterococcus. Was treated with macrobid followed by doxycycline. Currently does not have any UTI symptoms.    Notably, her mother has had problems with recurrent UTI and has been told her urethra is narrow; has had urethral dilations in the past but the effect did not last long. Her mother is now taking post coital abx ppx which seems to be helping    PHYSICAL EXAM  Patient is a 31 year old  female   Vitals: Blood pressure 102/68, height 1.676 m (5' 6\"), weight 66.2 kg (146 lb).  Body mass index is 23.57 kg/m .  General Appearance Adult:   Alert, no acute distress, oriented  HENT: throat/mouth:normal, good dentition  Lungs: no respiratory distress, or pursed lip breathing  Heart: No obvious jugular venous distension present  Abdomen: soft, nontender, no organomegaly or masses  Musculoskeltal: extremities normal, no peripheral edema  Skin: no suspicious lesions or rashes  Neuro: Alert, oriented, speech and mentation normal  Psych: affect and mood normal  Gait: Normal  : normal female external genitalia    PVR 43 ml today    All pertinent imaging reviewed:    CT abd/pelvis 1/11/2022  IMPRESSION: Unremarkable CT of the abdomen and pelvis with and without  contrast.      I reviewed images. Posterior right bladder somewhat protuberant around the uterus, which is what was seen on cystoscopy    PRE-PROCEDURE DIAGNOSIS: recurrent uti    POST-PROCEDURE DIAGNOSIS: recurrent uti    PROCEDURE: Cystoscopy    DESCRIPTION OF PROCEDURE: After informed consent was obtained, the patient was brought to the procedure room where she was placed in the lithotomy position with all pressure points well padded.  " The vulva was prepped and draped in sterile fashion. A flexible cystoscope was introduced through into a well-lubricated urethra.     The cystoscope was difficult to pass through the urethra and needed gentle constant pressure to passively dilate the urethra. Eventually I was able to pass the scope into the bladder. There was mild trabeculation with no cellules or diverticulae. There was a prominent posterior impression into the bladder from the uterus. The right posterior bladder appears somewhat thinned and stretched but not a true diverticulum per se. There was some mild generalized erythema which appeared inflammatory and not concerning for malignancy. No bladder stones or papillary tumor.    The flexible cystoscope was removed and the findings were described to the patient.         ASSESSMENT and PLAN   31 year old female who presents with a history of recurrent UTI. Recent diagnosis of enteroccocus UTI as well as ureaplasma. Her boyfriend also tested positive for ureaplasma and both the patient and her boyfriend are undergoing treatment for this.    No obvious source of recurrent UTI on cross sectional imaging or cystoscopy, except potentially for a moderately dilated portion of the bladder on the right posterior, which appears thin walled and may not empty as well (not a true bladder diverticulum). Notably her bladder scan PVR is only 43 ml today. She says she has a history of deferred voiding. I advised her to keep her bladder empty and do not defer voiding for more than a few hours, since bacteria in the bladder have exponential growth patterns    Her urethra was tight and I had difficulty passing a scope. I do not think she had a urethral stricture per se, but potentially this was more of an issue with pelvic floor dysfunction. Recommended relaxation exercises      Recommend frequent urination at every 3-4 hours, spread legged voiding, leaning forward to make sure she is emptying completely.   Consider  pelvic floor physical therapy for dysfuncitonal voiding if UTI persistent, may have tight pelvic floor musculature (notable for difficult passage of scope)  Complete doxycycline course for ureaplasma infection    Follow up with FELICIA in 3-6 months      Johnny Feliciano MD   [facility]  [facility] Phone: [phone]

## 2022-01-27 NOTE — PATIENT INSTRUCTIONS

## 2022-01-27 NOTE — LETTER
"1/27/2022       RE: Elaine Brooks  [id number] [facility] [address]     Dear Colleague,    Thank you for referring your patient, Elaine Brooks, to the [facility] [place] at [facility]. Please see a copy of my visit note below.    CHIEF COMPLAINT   Elaine Brooks who is a 31 year old female returns today for follow-up of recurrent UTI.      HPI   Elaine Brooks is a 31 year old female who presents with a history of recurrent UTI.  Seen initially 11/22/2021 by Lizzy Villanueva PA-C, see note for full details    Was + for ureaplasma 1/5/2022 and also enterococcus. Was treated with macrobid followed by doxycycline. Currently does not have any UTI symptoms.    Notably, her mother has had problems with recurrent UTI and has been told her urethra is narrow; has had urethral dilations in the past but the effect did not last long. Her mother is now taking post coital abx ppx which seems to be helping    PHYSICAL EXAM  Patient is a 31 year old  female   Vitals: Blood pressure 102/68, height 1.676 m (5' 6\"), weight 66.2 kg (146 lb).  Body mass index is 23.57 kg/m .  General Appearance Adult:   Alert, no acute distress, oriented  HENT: throat/mouth:normal, good dentition  Lungs: no respiratory distress, or pursed lip breathing  Heart: No obvious jugular venous distension present  Abdomen: soft, nontender, no organomegaly or masses  Musculoskeltal: extremities normal, no peripheral edema  Skin: no suspicious lesions or rashes  Neuro: Alert, oriented, speech and mentation normal  Psych: affect and mood normal  Gait: Normal  : normal female external genitalia    PVR 43 ml today    All pertinent imaging reviewed:    CT abd/pelvis 1/11/2022  IMPRESSION: Unremarkable CT of the abdomen and pelvis with and without  contrast.      I reviewed images. Posterior right bladder somewhat protuberant around the uterus, which is what was seen on " cystoscopy    PRE-PROCEDURE DIAGNOSIS: recurrent uti    POST-PROCEDURE DIAGNOSIS: recurrent uti    PROCEDURE: Cystoscopy    DESCRIPTION OF PROCEDURE: After informed consent was obtained, the patient was brought to the procedure room where she was placed in the lithotomy position with all pressure points well padded.  The vulva was prepped and draped in sterile fashion. A flexible cystoscope was introduced through into a well-lubricated urethra.     The cystoscope was difficult to pass through the urethra and needed gentle constant pressure to passively dilate the urethra. Eventually I was able to pass the scope into the bladder. There was mild trabeculation with no cellules or diverticulae. There was a prominent posterior impression into the bladder from the uterus. The right posterior bladder appears somewhat thinned and stretched but not a true diverticulum per se. There was some mild generalized erythema which appeared inflammatory and not concerning for malignancy. No bladder stones or papillary tumor.    The flexible cystoscope was removed and the findings were described to the patient.         ASSESSMENT and PLAN   31 year old female who presents with a history of recurrent UTI. Recent diagnosis of enteroccocus UTI as well as ureaplasma. Her boyfriend also tested positive for ureaplasma and both the patient and her boyfriend are undergoing treatment for this.    No obvious source of recurrent UTI on cross sectional imaging or cystoscopy, except potentially for a moderately dilated portion of the bladder on the right posterior, which appears thin walled and may not empty as well (not a true bladder diverticulum). Notably her bladder scan PVR is only 43 ml today. She says she has a history of deferred voiding. I advised her to keep her bladder empty and do not defer voiding for more than a few hours, since bacteria in the bladder have exponential growth patterns    Her urethra was tight and I had difficulty  passing a scope. I do not think she had a urethral stricture per se, but potentially this was more of an issue with pelvic floor dysfunction. Recommended relaxation exercises      Recommend frequent urination at every 3-4 hours, spread legged voiding, leaning forward to make sure she is emptying completely.   Consider pelvic floor physical therapy for dysfuncitonal voiding if UTI persistent, may have tight pelvic floor musculature (notable for difficult passage of scope)  Complete doxycycline course for ureaplasma infection    Follow up with FELICIA in 3-6 months      Johnny Feliciano MD   [facility]  [facility] Phone: [phone]

## 2022-02-13 ENCOUNTER — HEALTH MAINTENANCE LETTER (OUTPATIENT)
Age: 32
End: 2022-02-13

## 2022-03-30 DIAGNOSIS — Z78.9 USES BIRTH CONTROL: ICD-10-CM

## 2022-04-01 RX ORDER — DROSPIRENONE AND ETHINYL ESTRADIOL 0.02-3(28)
KIT ORAL
Qty: 84 TABLET | Refills: 2 | Status: SHIPPED | OUTPATIENT
Start: 2022-04-01 | End: 2022-06-22

## 2022-04-01 NOTE — TELEPHONE ENCOUNTER
"Last Written Prescription Date:  7/29/21  Last Fill Quantity: 84,  # refills: 1   Last office visit provider:  11/4/21     Requested Prescriptions   Pending Prescriptions Disp Refills     VESTURA 3-0.02 MG tablet [Pharmacy Med Name: VESTURA 3 MG-0.02 MG TABLET] 84 tablet 1     Sig: TAKE ONE TABLET BY MOUTH ONCE DAILY. NO FURTHER REFILLS UNTIL SEEN IN CLINIC       Contraceptives Protocol Passed - 4/1/2022 12:10 PM        Passed - Patient is not a current smoker if age is 35 or older        Passed - Recent (12 mo) or future (30 days) visit within the authorizing provider's specialty     Patient has had an office visit with the authorizing provider or a provider within the authorizing providers department within the previous 12 mos or has a future within next 30 days. See \"Patient Info\" tab in inbasket, or \"Choose Columns\" in Meds & Orders section of the refill encounter.              Passed - Medication is active on med list        Passed - No active pregnancy on record        Passed - No positive pregnancy test in past 12 months             Willie Lacey RN 04/01/22 12:13 PM  "
hard copy, drawn during this pregnancy

## 2022-06-22 ENCOUNTER — NURSE TRIAGE (OUTPATIENT)
Dept: NURSING | Facility: CLINIC | Age: 32
End: 2022-06-22

## 2022-06-22 DIAGNOSIS — Z78.9 USES BIRTH CONTROL: ICD-10-CM

## 2022-06-22 RX ORDER — DROSPIRENONE AND ETHINYL ESTRADIOL 0.02-3(28)
KIT ORAL
Qty: 84 TABLET | Refills: 1 | Status: SHIPPED | OUTPATIENT
Start: 2022-06-22 | End: 2022-08-11

## 2022-06-22 NOTE — TELEPHONE ENCOUNTER
TELEPHONE CALL -    Reason for call-  Prescription    Pt calling on vacation wanted her Birth Control to be transferred to a local pharmacy where she is at  This Rx  VESTURA 3-0.02 MG tablet - was prescribed bu her PCP Cal Perera MD -   She needs it at  [facility]   [id number] [place] [address]    VESTURA 3-0.02 MG tablet was sent to the pharmacy In [place] under the prescriber name Cal Manning.  Pharmacy called and confirmed medication is there they will have it ready in one Hr.   Called Pt and informed  Loretta Encarnacion RN [place] Nurse Advisor,  3:23 PM 6/22/2022

## 2022-07-22 DIAGNOSIS — Z78.9 USES BIRTH CONTROL: ICD-10-CM

## 2022-07-22 RX ORDER — DROSPIRENONE AND ETHINYL ESTRADIOL 0.02-3(28)
KIT ORAL
Qty: 84 TABLET | Refills: 1 | OUTPATIENT
Start: 2022-07-22

## 2022-07-22 NOTE — TELEPHONE ENCOUNTER
Pt is calling wanting to refill her RX for birth control. Pt wants only one more month sent over because after she may try for kids.     Pending Prescriptions:                       Disp   Refills    drospirenone-ethinyl estradiol (VESTURA) *84 tab*1            Sig: TAKE ONE TABLET BY MOUTH ONCE DAILY. NO FURTHER           REFILLS UNTIL SEEN IN CLINIC      Please send to [facility] pharmacy in [place].

## 2022-07-26 ENCOUNTER — OFFICE VISIT (OUTPATIENT)
Dept: UROLOGY | Facility: CLINIC | Age: 32
End: 2022-07-26
Payer: COMMERCIAL

## 2022-07-26 VITALS
DIASTOLIC BLOOD PRESSURE: 64 MMHG | HEIGHT: 66 IN | WEIGHT: 152 LBS | SYSTOLIC BLOOD PRESSURE: 100 MMHG | BODY MASS INDEX: 24.43 KG/M2

## 2022-07-26 DIAGNOSIS — N39.0 RECURRENT UTI: Primary | ICD-10-CM

## 2022-07-26 DIAGNOSIS — Z87.448 HISTORY OF URETHRAL NARROWING: ICD-10-CM

## 2022-07-26 PROCEDURE — 99213 OFFICE O/P EST LOW 20 MIN: CPT | Performed by: PHYSICIAN ASSISTANT

## 2022-07-26 ASSESSMENT — ENCOUNTER SYMPTOMS
HEMATURIA: 0
DYSURIA: 0

## 2022-07-26 ASSESSMENT — PAIN SCALES - GENERAL: PAINLEVEL: NO PAIN (0)

## 2022-07-26 NOTE — NURSING NOTE
Chief Complaint   Patient presents with     Recurrent UTI     Pt. States she doesn't currently have UTI symptoms, though has had symptoms twice since last visit, most recently in early June.        Celestina Ventura, EMT

## 2022-07-26 NOTE — LETTER
7/26/2022       RE: Elaine Galloway  [id number] [facility] [id number]     Dear Colleague,    Thank you for referring your patient, Elaine Galloway, to the [facility]OLOGY [facility] [place] at [facility]. Please see a copy of my visit note below.    Subjective      CHIEF COMPLAINT/REASON FOR VISIT   Follow up on rUTIs    HISTORY OF PRESENT ILLNESS   Ms. Galloway is a 31-year-old female, who presents today for follow-up regarding recurrent UTIs.  I initially thought.  Via video visit on 11/22/2021.  Patient was found to have a Enterococcus faecalis urinary tract infection in January.  She also was found to have a Ureaplasma infection.  Both of these were treated.  Her sexual partner was also treated.    Patient underwent cystoscopy.  She did have somewhat of a tight urethra as well as area of thinning on the right posterior portion of the bladder.  Postvoid residual was low.  Patient was recommended to urinate every 3-4 hours, do wide leg voiding, and leaning forward to make sure she was emptying her bladder.  Did also discussed possible pelvic floor physical therapy in the future.    Patient notes that since we last saw her, she has had symptoms of urinary tract infection twice.  One time, she took cranberry and this improved her symptoms.  Another time, she was on Macrobid.  Her symptoms were dysuria and urgency.  Most likely triggers for symptoms of a possible urinary tract infection at this time include dehydration, tampon, and recent sexual activity.  She notes her biggest concern is when she is dehydrated.    She currently denies any symptoms of urinary tract infection.  She has restarted her cranberry tablet.  She has been using timed voiding, wide leg voiding, and positioning changes.    The following portions of the patient's history were reviewed and updated as appropriate: allergies, current medications, past family history, past medical  "history, past social history, past surgical history, and problem list.     REVIEW OF SYSTEMS   Review of Systems   Genitourinary: Negative for dysuria and hematuria.      Per HPI.     Patient Active Problem List   Diagnosis     Pap Smear Vag w ASC-US - 11/2009      LSIL on pap 9/12/2008      CARDIOVASCULAR SCREENING; LDL GOAL LESS THAN 160     Thyroid function test abnormal     Controlled substance agreement signed     Encounter for screening for cardiovascular disorders     Cervical dysplasia      Past Medical History:   Diagnosis Date     Abnormal Pap smear 9/08, 11/09    colp 1/10 LINDA I     ASCUS favor benign 2/2014    Neg HPV, pap in 3 years     Controlled substance agreement signed 9/13/2014     Environmental allergies         Objective      PHYSICAL EXAM   /64   Ht 1.676 m (5' 6\")   Wt 68.9 kg (152 lb)   BMI 24.53 kg/m     Physical Exam  Constitutional:       Appearance: Normal appearance.   HENT:      Head: Normocephalic.   Eyes:      General: No scleral icterus.  Pulmonary:      Effort: Pulmonary effort is normal.   Abdominal:      General: There is no distension.   Musculoskeletal:      Cervical back: Normal range of motion.   Neurological:      General: No focal deficit present.      Mental Status: She is alert and oriented to person, place, and time.   Psychiatric:         Mood and Affect: Mood normal.         Behavior: Behavior normal.       LABORATORY     Recent Labs   Lab Test 01/27/22  0836 01/05/22  1507 11/11/19  0954   COLOR Yellow   < > Yellow   APPEARANCE Clear   < > Clear   URINEGLC Negative   < > Negative   URINEBILI Negative   < > Negative   URINEKETONE Negative   < > Negative   SG 1.020   < > 1.020   UBLD Trace*   < > Trace*   URINEPH 6.5   < > 7.5   PROTEIN Negative   < > Negative   UROBILINOGEN 0.2   < > 0.2 E.U./dL   NITRITE Negative   < > Negative   LEUKEST Negative   < > Negative   RBCU  --   --  0-2   WBCU  --   --  None Seen    < > = values in this interval not displayed. "       Assessment & Plan    1. Recurrent UTI    2. History of urethral narrowing        I had the pleasure today of meeting with Ms. Galloway to discuss her recurrent urinary tract infections.  She has had symptoms 2 times since we have last seen her.  We discussed that this is actually relatively good.  She was interested in possible postcoital antibiotics.  Most of her symptoms occur when she is dehydrated.  There is some correlation with sexual intercourse.    At this time, I discussed with her that I would recommend continuing to monitor.  If there becomes a higher correlation with urinary tract infections and sexual intercourse, then we can consider Macrobid.  Quite low.  I have recommended that she continue with good hydration.    She also discussed possible plans to try to start a family beginning next month.  We discussed that if she has recurrent infections while pregnant, she would likely be on a low-dose of antibiotics throughout pregnancy due to concern for the fetus.    At this time, we will plan the followin.Hydrate with water to keep the urine dilute.   2.Lifestyle and hygiene modifications: wiping front to back, wearing cotton breathable underwear, voiding before and after intercourse to flush the urethra, minimizing baths and opting for showers, and appropriate perineal hygiene.   3. Continue with cranberry pills, as this seems to help you.  4.  Get a urine culture when you have symptoms.  Call [phone] to get a UA and urine culture at any [place] lab.  5. Recommend frequent urination at every 3-4 hours, spread legged voiding, leaning forward to make sure she is emptying completely.   6. If UTIs become more frequent after intercourse, would consider a low dose of Macrobid after intercourse.  Monitor for now.    7.  Follow up in 6 months for recheck, sooner if issues.    Consider pelvic floor physical therapy for dysfuncitonal voiding if UTI persistent, may have tight pelvic floor  musculature (notable for difficult passage of scope)    Signed by:       Lizzy Villanueva PA-C 7/26/2022 2:35 PM

## 2022-07-26 NOTE — PATIENT INSTRUCTIONS
1.Hydrate with water to keep the urine dilute.   2.Lifestyle and hygiene modifications: wiping front to back, wearing cotton breathable underwear, voiding before and after intercourse to flush the urethra, minimizing baths and opting for showers, and appropriate perineal hygiene.   3. Continue with cranberry pills, as this seems to help you.  4.  Get a urine culture when you have symptoms.  Call [phone] to get a UA and urine culture at any [place] lab.  5. Recommend frequent urination at every 3-4 hours, spread legged voiding, leaning forward to make sure she is emptying completely.   6. If UTIs become more frequent after intercourse, would consider a low dose of Macrobid after intercourse.  Monitor for now.    7.  Follow up in 6 months for recheck, sooner if issues.    Consider pelvic floor physical therapy for dysfuncitonal voiding if UTI persistent, may have tight pelvic floor musculature (notable for difficult passage of scope)

## 2022-07-26 NOTE — PROGRESS NOTES
Subjective      CHIEF COMPLAINT/REASON FOR VISIT   Follow up on rUTIs    HISTORY OF PRESENT ILLNESS   Ms. Galloway is a 31-year-old female, who presents today for follow-up regarding recurrent UTIs.  I initially thought.  Via video visit on 11/22/2021.  Patient was found to have a Enterococcus faecalis urinary tract infection in January.  She also was found to have a Ureaplasma infection.  Both of these were treated.  Her sexual partner was also treated.    Patient underwent cystoscopy.  She did have somewhat of a tight urethra as well as area of thinning on the right posterior portion of the bladder.  Postvoid residual was low.  Patient was recommended to urinate every 3-4 hours, do wide leg voiding, and leaning forward to make sure she was emptying her bladder.  Did also discussed possible pelvic floor physical therapy in the future.    Patient notes that since we last saw her, she has had symptoms of urinary tract infection twice.  One time, she took cranberry and this improved her symptoms.  Another time, she was on Macrobid.  Her symptoms were dysuria and urgency.  Most likely triggers for symptoms of a possible urinary tract infection at this time include dehydration, tampon, and recent sexual activity.  She notes her biggest concern is when she is dehydrated.    She currently denies any symptoms of urinary tract infection.  She has restarted her cranberry tablet.  She has been using timed voiding, wide leg voiding, and positioning changes.    The following portions of the patient's history were reviewed and updated as appropriate: allergies, current medications, past family history, past medical history, past social history, past surgical history, and problem list.     REVIEW OF SYSTEMS   Review of Systems   Genitourinary: Negative for dysuria and hematuria.      Per HPI.     Patient Active Problem List   Diagnosis     Pap Smear Vag w ASC-US - 11/2009      LSIL on pap 9/12/2008      CARDIOVASCULAR SCREENING;  "LDL GOAL LESS THAN 160     Thyroid function test abnormal     Controlled substance agreement signed     Encounter for screening for cardiovascular disorders     Cervical dysplasia      Past Medical History:   Diagnosis Date     Abnormal Pap smear 9/08, 11/09    colp 1/10 LINDA I     ASCUS favor benign 2/2014    Neg HPV, pap in 3 years     Controlled substance agreement signed 9/13/2014     Environmental allergies         Objective      PHYSICAL EXAM   /64   Ht 1.676 m (5' 6\")   Wt 68.9 kg (152 lb)   BMI 24.53 kg/m     Physical Exam  Constitutional:       Appearance: Normal appearance.   HENT:      Head: Normocephalic.   Eyes:      General: No scleral icterus.  Pulmonary:      Effort: Pulmonary effort is normal.   Abdominal:      General: There is no distension.   Musculoskeletal:      Cervical back: Normal range of motion.   Neurological:      General: No focal deficit present.      Mental Status: She is alert and oriented to person, place, and time.   Psychiatric:         Mood and Affect: Mood normal.         Behavior: Behavior normal.       LABORATORY     Recent Labs   Lab Test 01/27/22  0836 01/05/22  1507 11/11/19  0954   COLOR Yellow   < > Yellow   APPEARANCE Clear   < > Clear   URINEGLC Negative   < > Negative   URINEBILI Negative   < > Negative   URINEKETONE Negative   < > Negative   SG 1.020   < > 1.020   UBLD Trace*   < > Trace*   URINEPH 6.5   < > 7.5   PROTEIN Negative   < > Negative   UROBILINOGEN 0.2   < > 0.2 E.U./dL   NITRITE Negative   < > Negative   LEUKEST Negative   < > Negative   RBCU  --   --  0-2   WBCU  --   --  None Seen    < > = values in this interval not displayed.       Assessment & Plan    1. Recurrent UTI    2. History of urethral narrowing        I had the pleasure today of meeting with Ms. Galloway to discuss her recurrent urinary tract infections.  She has had symptoms 2 times since we have last seen her.  We discussed that this is actually relatively good.  She was interested " in possible postcoital antibiotics.  Most of her symptoms occur when she is dehydrated.  There is some correlation with sexual intercourse.    At this time, I discussed with her that I would recommend continuing to monitor.  If there becomes a higher correlation with urinary tract infections and sexual intercourse, then we can consider Macrobid.  Quite low.  I have recommended that she continue with good hydration.    She also discussed possible plans to try to start a family beginning next month.  We discussed that if she has recurrent infections while pregnant, she would likely be on a low-dose of antibiotics throughout pregnancy due to concern for the fetus.    At this time, we will plan the followin.Hydrate with water to keep the urine dilute.   2.Lifestyle and hygiene modifications: wiping front to back, wearing cotton breathable underwear, voiding before and after intercourse to flush the urethra, minimizing baths and opting for showers, and appropriate perineal hygiene.   3. Continue with cranberry pills, as this seems to help you.  4.  Get a urine culture when you have symptoms.  Call [phone] to get a UA and urine culture at any [place] lab.  5. Recommend frequent urination at every 3-4 hours, spread legged voiding, leaning forward to make sure she is emptying completely.   6. If UTIs become more frequent after intercourse, would consider a low dose of Macrobid after intercourse.  Monitor for now.    7.  Follow up in 6 months for recheck, sooner if issues.    Consider pelvic floor physical therapy for dysfuncitonal voiding if UTI persistent, may have tight pelvic floor musculature (notable for difficult passage of scope)    Signed by:       Lizzy Villanueva PA-C 2022 2:35 PM

## 2022-08-05 ASSESSMENT — ENCOUNTER SYMPTOMS
FREQUENCY: 0
DIZZINESS: 0
HEMATOCHEZIA: 0
HEADACHES: 0
COUGH: 0
MYALGIAS: 0
NAUSEA: 0
PARESTHESIAS: 0
CONSTIPATION: 0
PALPITATIONS: 0
JOINT SWELLING: 0
ABDOMINAL PAIN: 0
HEARTBURN: 0
FEVER: 0
EYE PAIN: 0
DIARRHEA: 0
NERVOUS/ANXIOUS: 0
HEMATURIA: 0
WEAKNESS: 0
DYSURIA: 0
SORE THROAT: 0
ARTHRALGIAS: 0
CHILLS: 0
SHORTNESS OF BREATH: 0
BREAST MASS: 0

## 2022-08-11 ENCOUNTER — OFFICE VISIT (OUTPATIENT)
Dept: FAMILY MEDICINE | Facility: CLINIC | Age: 32
End: 2022-08-11
Payer: COMMERCIAL

## 2022-08-11 VITALS
HEART RATE: 75 BPM | HEIGHT: 66 IN | SYSTOLIC BLOOD PRESSURE: 108 MMHG | WEIGHT: 152 LBS | OXYGEN SATURATION: 99 % | DIASTOLIC BLOOD PRESSURE: 62 MMHG | RESPIRATION RATE: 18 BRPM | BODY MASS INDEX: 24.43 KG/M2

## 2022-08-11 DIAGNOSIS — Z30.09 BIRTH CONTROL COUNSELING: ICD-10-CM

## 2022-08-11 DIAGNOSIS — Z00.00 ROUTINE GENERAL MEDICAL EXAMINATION AT A HEALTH CARE FACILITY: Primary | ICD-10-CM

## 2022-08-11 DIAGNOSIS — Z13.1 DIABETES MELLITUS SCREENING: ICD-10-CM

## 2022-08-11 DIAGNOSIS — Z13.220 LIPID SCREENING: ICD-10-CM

## 2022-08-11 DIAGNOSIS — Z12.4 CERVICAL CANCER SCREENING: ICD-10-CM

## 2022-08-11 PROCEDURE — 87624 HPV HI-RISK TYP POOLED RSLT: CPT | Performed by: NURSE PRACTITIONER

## 2022-08-11 PROCEDURE — 99395 PREV VISIT EST AGE 18-39: CPT | Mod: 25 | Performed by: NURSE PRACTITIONER

## 2022-08-11 PROCEDURE — G0145 SCR C/V CYTO,THINLAYER,RESCR: HCPCS | Performed by: NURSE PRACTITIONER

## 2022-08-11 PROCEDURE — G0124 SCREEN C/V THIN LAYER BY MD: HCPCS | Performed by: PATHOLOGY

## 2022-08-11 PROCEDURE — 90471 IMMUNIZATION ADMIN: CPT | Performed by: NURSE PRACTITIONER

## 2022-08-11 PROCEDURE — 90714 TD VACC NO PRESV 7 YRS+ IM: CPT | Performed by: NURSE PRACTITIONER

## 2022-08-11 RX ORDER — DROSPIRENONE AND ETHINYL ESTRADIOL 0.02-3(28)
KIT ORAL
Qty: 84 TABLET | Refills: 3 | Status: SHIPPED | OUTPATIENT
Start: 2022-08-11 | End: 2023-09-05

## 2022-08-11 ASSESSMENT — PAIN SCALES - GENERAL: PAINLEVEL: NO PAIN (0)

## 2022-08-11 NOTE — PROGRESS NOTES
Assessment and Plan:    Routine general medical examination at a health care facility  Recommend consuming a healthy diet and exercising.  Updated TD vaccine.  She declines HIV and hepatitis C screening.  She is not fasting today and will follow-up for fasting labs.  - Hemoglobin    Diabetes mellitus screening   - Glucose    Cervical cancer screening  - Pap screen with HPV - recommended age 30 - 65 years    Lipid screening  - Lipid panel reflex to direct LDL Fasting    Birth control counseling  Renewed OCP.  Educated on medications and side effects including increased risk of blood clots.  - drospirenone-ethinyl estradiol (VESTURA) 3-0.02 MG tablet  Dispense: 84 tablet; Refill: 3      Subjective:     Elaine is a 32 year old female presenting to the clinic for a female physical.     LMP: 3 weeks ago, regular once/month   Hx of abnormal pap smear: 2019 ascus, 2016 ASCUS, 2015 positive high risk HPV   Last pap smear: 2019 ascus  Perform self-breast exams: yes   Vaginal discharge or irritation: none   Sexually active: yes, in a relationship with a male for 1 year   Contraception: ocp   Concerns for STDs: none   Previous pregnancies:none     Patient is feeling well today and has no other concerns.    Review of systems:  I performed a 10 point review of systems.  All pertinent positives and negatives are noted in the HPI. All others are negative.     Allergies   Allergen Reactions     Skin Adhesives Itching     Can also get blisters     Sulfa Drugs        Current Outpatient Medications   Medication     Cranberry 500 MG CAPS     drospirenone-ethinyl estradiol (VESTURA) 3-0.02 MG tablet     MULTIPLE VITAMINS-IRON PO     No current facility-administered medications for this visit.       Social History     Socioeconomic History     Marital status: Single     Spouse name: Not on file     Number of children: Not on file     Years of education: Not on file     Highest education level: Not on file   Occupational History     Not  on file   Tobacco Use     Smoking status: Never Smoker     Smokeless tobacco: Never Used   Substance and Sexual Activity     Alcohol use: Yes     Alcohol/week: 1.0 standard drink     Comment: Alcoholic Drinks/day: Socially     Drug use: No     Sexual activity: Yes     Partners: Male     Birth control/protection: Pill, OCP     Comment: in relationship    Other Topics Concern      Service Not Asked     Blood Transfusions Not Asked     Caffeine Concern No     Comment: 1 can of pop every other day.       Occupational Exposure Not Asked     Hobby Hazards Not Asked     Sleep Concern Yes     Stress Concern Yes     Weight Concern Not Asked     Special Diet Not Asked     Back Care Not Asked     Exercise Yes     Bike Helmet Not Asked     Seat Belt Yes     Self-Exams Not Asked     Parent/sibling w/ CABG, MI or angioplasty before 65F 55M? 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    School - [employer], transfer to the [employer] for engineering in two years.  Date: 2014.    2 year - [employer].       Social Determinants of Health     Financial Resource Strain: Not on file   Food Insecurity: Not on file   Transportation Needs: Not on file   Physical Activity: Not on file   Stress: Not on file   Social Connections: Not on file   Intimate Partner Violence: Not on file   Housing Stability: Not on file       Past Medical History:   Diagnosis Date     Abnormal Pap smear 9/08, 11/09    colp 1/10 LINDA I     ASCUS favor benign 2/2014    Neg HPV, pap in 3 years     Controlled substance agreement signed 9/13/2014     Environmental allergies        Family History   Problem Relation Age of Onset     Diabetes Maternal Grandmother      Diabetes Paternal Grandfather      Thyroid Disease Other         aunt has hypothyroidism     No Known Problems Mother      Arthritis Maternal Grandmother      Kidney Disease Maternal Grandmother         going to have kidney transplant     Diabetes Paternal  "Grandmother      Vision Loss Paternal Grandfather      No Known Problems Sister      Vision Loss Maternal Grandfather        Past Surgical History:   Procedure Laterality Date     HC REMOVE TONSILS/ADENOIDS,<13 Y/O  1994    T & A <11y.o.     LEG SURGERY       SHOULDER SURGERY       TONSILLECTOMY & ADENOIDECTOMY         Objective:     /62   Pulse 75   Resp 18   Ht 1.676 m (5' 6\")   Wt 68.9 kg (152 lb)   SpO2 99%   BMI 24.53 kg/m      Patient is alert, no obvious distress.   Skin: Warm, dry.  No rashes or lesions. Skin turgor rapid return.   HEENT:  Eyes normal.  Ears normal.  Nose patent, mucosa pink.  Oropharynx mucosa pink, no lesions or tonsil enlargement.   Neck:  Supple, without lymphadenopathy, bruits, JVD. Thyroid normal texture and size.    Lungs:  Clear to auscultation.  No wheezing, rales noted.  Respirations even and unlabored.   Heart:  Regular rate and rhythm.  No murmurs.   Breasts:  Normal.  No surrounding adenopathy.   Abdomen: Soft, nontender.  No organomegaly.  Bowel sounds normoactive.  No guarding or masses noted.   :  External genitalia normal.  Normal vaginal mucosa.  Cervix no lesions or cervical motion tenderness.   Musculoskeletal:  Full ROM of extremities.  Muscle strength equal +5/5.   Neurological:  Cranial nerves 2-12 intact.           Answers for HPI/ROS submitted by the patient on 8/5/2022  Frequency of exercise:: 2-3 days/week  Getting at least 3 servings of Calcium per day:: Yes  Diet:: Regular (no restrictions)  Taking medications regularly:: Yes  Medication side effects:: Not applicable  Bi-annual eye exam:: Yes  Dental care twice a year:: Yes  Sleep apnea or symptoms of sleep apnea:: None  abdominal pain: No  Blood in stool: No  Blood in urine: No  chest pain: No  chills: No  congestion: No  constipation: No  cough: No  diarrhea: No  dizziness: No  ear pain: No  eye pain: No  nervous/anxious: No  fever: No  frequency: No  genital sores: No  headaches: No  hearing " loss: No  heartburn: No  arthralgias: No  joint swelling: No  peripheral edema: No  mood changes: No  myalgias: No  nausea: No  dysuria: No  palpitations: No  Skin sensation changes: No  sore throat: No  urgency: No  rash: No  shortness of breath: No  visual disturbance: No  weakness: No  pelvic pain: No  vaginal bleeding: No  vaginal discharge: No  tenderness: No  breast mass: No  breast discharge: No  Additional concerns today:: No  Duration of exercise:: 30-45 minutes

## 2022-08-17 LAB
BKR LAB AP GYN ADEQUACY: ABNORMAL
BKR LAB AP GYN INTERPRETATION: ABNORMAL
BKR LAB AP HPV REFLEX: ABNORMAL
BKR LAB AP PREVIOUS ABNL DX: ABNORMAL
BKR LAB AP PREVIOUS ABNORMAL: ABNORMAL
PATH REPORT.COMMENTS IMP SPEC: ABNORMAL
PATH REPORT.COMMENTS IMP SPEC: ABNORMAL
PATH REPORT.RELEVANT HX SPEC: ABNORMAL

## 2022-08-19 LAB
HUMAN PAPILLOMA VIRUS 16 DNA: NEGATIVE
HUMAN PAPILLOMA VIRUS 18 DNA: NEGATIVE
HUMAN PAPILLOMA VIRUS FINAL DIAGNOSIS: NORMAL
HUMAN PAPILLOMA VIRUS OTHER HR: NEGATIVE

## 2022-08-25 ENCOUNTER — PATIENT OUTREACH (OUTPATIENT)
Dept: FAMILY MEDICINE | Facility: CLINIC | Age: 32
End: 2022-08-25

## 2022-10-15 ENCOUNTER — HEALTH MAINTENANCE LETTER (OUTPATIENT)
Age: 32
End: 2022-10-15

## 2023-02-16 ENCOUNTER — TRANSFERRED RECORDS (OUTPATIENT)
Dept: HEALTH INFORMATION MANAGEMENT | Facility: CLINIC | Age: 33
End: 2023-02-16

## 2023-02-16 LAB
ABO (EXTERNAL): NORMAL
RH (EXTERNAL): POSITIVE

## 2023-03-16 ENCOUNTER — TELEPHONE (OUTPATIENT)
Dept: SCHEDULING | Facility: CLINIC | Age: 33
End: 2023-03-16
Payer: COMMERCIAL

## 2023-03-16 NOTE — TELEPHONE ENCOUNTER
Reviewed below, no OB/GYN at [facility]. Pt should schedule appt with [facility]     Trey CHAN RN

## 2023-03-16 NOTE — TELEPHONE ENCOUNTER
Reason for Call:  Appointment Request    Patient requesting this type of appt:  Pregnancy     Requested provider: no pcp    Reason patient unable to be scheduled: transfering ob     When does patient want to be seen/preferred time: 1-2 weeks    Comments: requesting an appointment in [place] for transferring ob. Currently 12 weeks.     Could we send this information to you in [facility] or would you prefer to receive a phone call?:   Patient would prefer a phone call   Okay to leave a detailed message?: Yes at Home number on file [phone] (home)    Call taken on 3/16/2023 at 9:38 AM by Dimple Cornejo

## 2023-07-24 ENCOUNTER — PATIENT OUTREACH (OUTPATIENT)
Dept: FAMILY MEDICINE | Facility: CLINIC | Age: 33
End: 2023-07-24
Payer: COMMERCIAL

## 2023-08-01 ENCOUNTER — HOSPITAL ENCOUNTER (OUTPATIENT)
Facility: CLINIC | Age: 33
Setting detail: OBSERVATION
Discharge: HOME | End: 2023-08-02
Attending: OBSTETRICS & GYNECOLOGY | Admitting: OBSTETRICS & GYNECOLOGY
Payer: COMMERCIAL

## 2023-08-01 PROBLEM — Z36.89 ENCOUNTER FOR TRIAGE IN PREGNANT PATIENT: Status: ACTIVE | Noted: 2023-08-01

## 2023-08-01 LAB
CRYSTALS AMN MICRO: NORMAL
RUPTURE OF FETAL MEMBRANES BY ROM PLUS: NEGATIVE

## 2023-08-01 PROCEDURE — 84112 EVAL AMNIOTIC FLUID PROTEIN: CPT | Performed by: OBSTETRICS & GYNECOLOGY

## 2023-08-01 PROCEDURE — 96372 THER/PROPH/DIAG INJ SC/IM: CPT | Performed by: OBSTETRICS & GYNECOLOGY

## 2023-08-01 PROCEDURE — G0463 HOSPITAL OUTPT CLINIC VISIT: HCPCS | Mod: 25

## 2023-08-01 PROCEDURE — 250N000011 HC RX IP 250 OP 636: Performed by: OBSTETRICS & GYNECOLOGY

## 2023-08-01 RX ORDER — NALOXONE HYDROCHLORIDE 0.4 MG/ML
0.4 INJECTION, SOLUTION INTRAMUSCULAR; INTRAVENOUS; SUBCUTANEOUS
Status: DISCONTINUED | OUTPATIENT
Start: 2023-08-01 | End: 2023-08-02 | Stop reason: HOSPADM

## 2023-08-01 RX ORDER — BETAMETHASONE SODIUM PHOSPHATE AND BETAMETHASONE ACETATE 3; 3 MG/ML; MG/ML
12 INJECTION, SUSPENSION INTRA-ARTICULAR; INTRALESIONAL; INTRAMUSCULAR; SOFT TISSUE ONCE
Status: COMPLETED | OUTPATIENT
Start: 2023-08-01 | End: 2023-08-01

## 2023-08-01 RX ORDER — NALBUPHINE HYDROCHLORIDE 20 MG/ML
2.5-5 INJECTION, SOLUTION INTRAMUSCULAR; INTRAVENOUS; SUBCUTANEOUS EVERY 6 HOURS PRN
Status: DISCONTINUED | OUTPATIENT
Start: 2023-08-01 | End: 2023-08-02 | Stop reason: HOSPADM

## 2023-08-01 RX ORDER — FENTANYL CITRATE-0.9 % NACL/PF 10 MCG/ML
100 PLASTIC BAG, INJECTION (ML) INTRAVENOUS EVERY 5 MIN PRN
Status: DISCONTINUED | OUTPATIENT
Start: 2023-08-01 | End: 2023-08-02 | Stop reason: HOSPADM

## 2023-08-01 RX ORDER — NALOXONE HYDROCHLORIDE 0.4 MG/ML
0.2 INJECTION, SOLUTION INTRAMUSCULAR; INTRAVENOUS; SUBCUTANEOUS
Status: DISCONTINUED | OUTPATIENT
Start: 2023-08-01 | End: 2023-08-02 | Stop reason: HOSPADM

## 2023-08-01 RX ORDER — VITAMIN A ACETATE, .BETA.-CAROTENE, ASCORBIC ACID, CHOLECALCIFEROL, .ALPHA.-TOCOPHEROL ACETATE, DL-, THIAMINE MONONITRATE, RIBOFLAVIN, NIACINAMIDE, PYRIDOXINE HYDROCHLORIDE, FOLIC ACID, CYANOCOBALAMIN, CALCIUM CARBONATE, FERROUS FUMARATE, ZINC OXIDE, AND CUPRIC OXIDE 2000; 2000; 120; 400; 22; 1.84; 3; 20; 10; 1; 12; 200; 27; 25; 2 [IU]/1; [IU]/1; MG/1; [IU]/1; MG/1; MG/1; MG/1; MG/1; MG/1; MG/1; UG/1; MG/1; MG/1; MG/1; MG/1
1 TABLET ORAL DAILY
COMMUNITY

## 2023-08-01 RX ORDER — OMEPRAZOLE 40 MG/1
40 CAPSULE, DELAYED RELEASE ORAL DAILY
COMMUNITY

## 2023-08-01 RX ADMIN — BETAMETHASONE SODIUM PHOSPHATE AND BETAMETHASONE ACETATE 12 MG: 3; 3 INJECTION, SUSPENSION INTRA-ARTICULAR; INTRALESIONAL; INTRAMUSCULAR at 17:23

## 2023-08-01 ASSESSMENT — ACTIVITIES OF DAILY LIVING (ADL)
DOING_ERRANDS_INDEPENDENTLY_DIFFICULTY: NO
DIFFICULTY_EATING/SWALLOWING: NO
CHANGE_IN_FUNCTIONAL_STATUS_SINCE_ONSET_OF_CURRENT_ILLNESS/INJURY: NO
CONCENTRATING,_REMEMBERING_OR_MAKING_DECISIONS_DIFFICULTY: NO
TOILETING_ISSUES: NO
ADLS_ACUITY_SCORE: 18
WEAR_GLASSES_OR_BLIND: NO
FALL_HISTORY_WITHIN_LAST_SIX_MONTHS: NO
ADLS_ACUITY_SCORE: 35
WALKING_OR_CLIMBING_STAIRS_DIFFICULTY: NO
ADLS_ACUITY_SCORE: 35
ADLS_ACUITY_SCORE: 18
DRESSING/BATHING_DIFFICULTY: NO

## 2023-08-01 NOTE — PROVIDER NOTIFICATION
08/01/23 4608   Provider Notification   Provider Name/Title Dr. Erickson   Method of Notification Phone     MD updated on fern and ROM + negative results. Category 1 FHR with no contractions. Betamethasone given at 1720.    Plan for patient to stay overnight for monitoring. Continuous FHR monitoring overnight, q4 vitals, and repeat ultrasound tomorrow morning to check fluid. Pt can have regular diet. Encourage oral hydration.

## 2023-08-01 NOTE — PLAN OF CARE
"Data: [facility] presented to [facility]: 2023  4:45 PM.  Reason for maternal/fetal assessment is rule out rupture. Patient sent over from clinic with DASHA 2.3, pt has had white discharge for the last \"few weeks\".  Patient is a .  Prenatal record reviewed. Pregnancy  has been complicated by velamentous cord insertion.  Gestational Age Unknown. VSS. Fetal movement active. Patient denies uterine contractions, vaginal bleeding, abdominal pain, pelvic pressure, nausea, vomiting, headache, visual disturbances, epigastric or URQ pain, significant edema. Support person is present.   Action: Verbal consent for EFM. Triage assessment completed. Bill of rights reviewed.  Response: Patient verbalized agreement with plan. Will contact Dr Jw Erickson with update and for further orders.     Verbal orders from Dr. Isidro for ROM +, fern, and betamethasone. Plan for patient to stay overnight for repeat DASHA tomorrow.  "

## 2023-08-02 ENCOUNTER — HOSPITAL ENCOUNTER (OUTPATIENT)
Facility: CLINIC | Age: 33
Setting detail: OBSERVATION
End: 2023-08-02
Admitting: OBSTETRICS & GYNECOLOGY
Payer: COMMERCIAL

## 2023-08-02 ENCOUNTER — APPOINTMENT (OUTPATIENT)
Dept: ULTRASOUND IMAGING | Facility: CLINIC | Age: 33
End: 2023-08-02
Attending: OBSTETRICS & GYNECOLOGY
Payer: COMMERCIAL

## 2023-08-02 VITALS
BODY MASS INDEX: 27 KG/M2 | RESPIRATION RATE: 17 BRPM | TEMPERATURE: 97.7 F | HEIGHT: 66 IN | DIASTOLIC BLOOD PRESSURE: 56 MMHG | WEIGHT: 168 LBS | SYSTOLIC BLOOD PRESSURE: 90 MMHG

## 2023-08-02 DIAGNOSIS — Z36.2 ENCOUNTER FOR FOLLOW-UP ULTRASOUND OF FETAL ANATOMY: Primary | ICD-10-CM

## 2023-08-02 PROBLEM — O41.00X0 OLIGOHYDRAMNIOS: Status: ACTIVE | Noted: 2023-08-02

## 2023-08-02 LAB — RADIOLOGIST FLAGS: ABNORMAL

## 2023-08-02 PROCEDURE — 76815 OB US LIMITED FETUS(S): CPT

## 2023-08-02 PROCEDURE — 250N000011 HC RX IP 250 OP 636: Performed by: OBSTETRICS & GYNECOLOGY

## 2023-08-02 PROCEDURE — G0378 HOSPITAL OBSERVATION PER HR: HCPCS

## 2023-08-02 PROCEDURE — 99232 SBSQ HOSP IP/OBS MODERATE 35: CPT | Mod: 25 | Performed by: STUDENT IN AN ORGANIZED HEALTH CARE EDUCATION/TRAINING PROGRAM

## 2023-08-02 PROCEDURE — 96372 THER/PROPH/DIAG INJ SC/IM: CPT | Performed by: OBSTETRICS & GYNECOLOGY

## 2023-08-02 PROCEDURE — 76811 OB US DETAILED SNGL FETUS: CPT

## 2023-08-02 PROCEDURE — 76811 OB US DETAILED SNGL FETUS: CPT | Mod: 26 | Performed by: STUDENT IN AN ORGANIZED HEALTH CARE EDUCATION/TRAINING PROGRAM

## 2023-08-02 RX ORDER — BETAMETHASONE SODIUM PHOSPHATE AND BETAMETHASONE ACETATE 3; 3 MG/ML; MG/ML
12 INJECTION, SUSPENSION INTRA-ARTICULAR; INTRALESIONAL; INTRAMUSCULAR; SOFT TISSUE ONCE
Status: COMPLETED | OUTPATIENT
Start: 2023-08-02 | End: 2023-08-02

## 2023-08-02 RX ADMIN — BETAMETHASONE SODIUM PHOSPHATE AND BETAMETHASONE ACETATE 12 MG: 3; 3 INJECTION, SUSPENSION INTRA-ARTICULAR; INTRALESIONAL; INTRAMUSCULAR at 15:36

## 2023-08-02 ASSESSMENT — ACTIVITIES OF DAILY LIVING (ADL)
ADLS_ACUITY_SCORE: 18

## 2023-08-02 NOTE — DISCHARGE INSTRUCTIONS
Discharge Instruction for Undelivered Patients      You were seen for: Membrane Assessment   We Consulted: Dr. Erickson and Dr. Bird  You had (Test or Medicine):uterine and fetal monitoring, ultrasound (hospital and [facility]), MFM consult, betamethasone injection.     Diet:   Drink 8 to 12 glasses of liquids (milk, juice, water) every day.  You may eat meals and snacks.     Activity:  Call your doctor or nurse midwife if your baby is moving less than usual.     Call your provider if you notice:  Swelling in your face or increased swelling in your hands or legs.  Headaches that are not relieved by Tylenol (acetaminophen).  Changes in your vision (blurring: seeing spots or stars.)  Nausea (sick to your stomach) and vomiting (throwing up).   Weight gain of 5 pounds or more per week.  Heartburn that doesn't go away.  Signs of bladder infection: pain when you urinate (use the toilet), need to go more often and more urgently.  The bag of zavala (rupture of membranes) breaks, or you notice leaking in your underwear.  Bright red blood in your underwear.  Abdominal (lower belly) or stomach pain.  *If less than 34 weeks: Contractions (tightening) more than 6 times in one hour.  Increase or change in vaginal discharge (note the color and amount)  Other: Expect to receive phone from [facility] to schedule ultrasounds and follow up appointments.    Follow-up:  Make an appointment to be seen on next week with primary OB for ultrasound and office visit and with [facility] in three weeks for follow up anatomy scan.

## 2023-08-02 NOTE — PROVIDER NOTIFICATION
08/02/23 1258   Provider Notification   Provider Name/Title Dr. Bolton   Method of Notification At Bedside   Request Evaluate in Person     Dr. Bolton, perinatologist at bedside to discuss [facility] ultrasound results. Discussed anatomy findings, fluid volume, and cord insertion (see imagine tab). Patient and spouse questions answered. Plan per provider is weekly ultrasound for fluid checks in primary clinic with follow up growth scan at [facility] in 3 weeks.

## 2023-08-02 NOTE — PLAN OF CARE
Data: Patient assessed in the [facility] for oligohydramnios found on clinic ultrasound 8/2.  Cervical exam not examined.  Membranes intact per fern and ROM Plus.  Contractions/uterine assessment irregular per toco and palpation. Patient reports she is not feeling contractions.  Action:  Presumed adequate fetal oxygenation documented (see flow record). Discharge instructions reviewed.  Patient instructed to report change in fetal movement, vaginal leaking of fluid or bleeding, abdominal pain, or any concerns related to the pregnancy to her nurse/physician. 2nd betamethasone administered.  Response: Orders to discharge home per Faraz Bird.  Patient verbalized understanding of education and verbalized agreement with plan. Discharged to home at 1540.

## 2023-08-02 NOTE — PROVIDER NOTIFICATION
08/02/23 0942   Provider Notification   Provider Name/Title Dr. NIKOLAI Bird   Method of Notification Phone   Request Evaluate - Remote   Notification Reason Lab/Diagnostic Study     Dr. Bird paged and updated on ultrasound findings with MVP of 2.2cm and overall DASHA of 3.0cm. Orders received for MFM consult with M comprehensive ultrasound. Will plan to update MD after results and consult.

## 2023-08-02 NOTE — PROGRESS NOTES
Please see the full imaging report from the View[employer] program under the imaging tab.    Tahira Bolton MD  Maternal Fetal Medicine

## 2023-08-02 NOTE — PROVIDER NOTIFICATION
08/02/23 0759   Provider Notification   Provider Name/Title Dr. NIKOLAI Bird   Method of Notification At Bedside   Request Evaluate in Person     Dr. Bird at bedside to evaluate patient and discuss plan of care. Patient resting comfortably overnight, FHR tracing category I and appropriate for gestational age, patient denies leaking of fluid. Plan to go to ultrasound now for DASHA check. Will update MD with results and obtain further orders at that time. Primary RN Rozina updated.

## 2023-08-02 NOTE — H&P
33 yo  at 32+2 wks. Patient had routine OB visit yesterday and reported increased vaginal discharge. US was ordered and showed a diminished DASHA with DASHA 2.3 and MVP 2.3. She was sent to the MAC for further evaluation.    In the MAC, fern negative and ROM plus negative. She was admitted for observation. This AM, she denied increased vaginal discharge overnight.    PMH  Ill none   Surg None   Meds  PNV   All Sulfa    OBHx velamentous cord    PE  Well appearing   AFVSS   [place] None   FHT Cat 1 tracing   SVE deferred    Plan  1)s/p BMTZ x 1; dose 2 due today  2)Repeat DASHA showed MVP 2.2  3)Consult MFM re: next steps

## 2023-08-02 NOTE — PROGRESS NOTES
MFM consult placed     notes largest MVP is 4.7cm. She advises weekly US for DASHA check    Plan discharge after 2nd dose of BMTZ

## 2023-08-02 NOTE — PROVIDER NOTIFICATION
08/02/23 1309   Provider Notification   Provider Name/Title Dr. Marrero   Method of Notification In Department   Request Evaluate - Remote     Dr. Bird at nurse station and reviewed MFM ultrasound results and recommendations with Dr. Bolton perinatologist. Plan is for patient to discharge later this afternoon after 2nd betamethasone injection, may discontinue EFM and toco at this time. Plan for patient to follow up in primary clinic next week.   Called patient and spoke to his wife who said he is no longer seeing his diabetic doctor and will be out of all of his medication in the next seven days, he will now be seeing his pcp for any of those issues, patients wife stated he will need omeprazole, metformin and glipizide refilled. Please advise.   Patient is a 75y old  Female who presents with a chief complaint of shortness of breath (26 Mar 2019 09:46)      SUBJECTIVE / OVERNIGHT EVENTS: vag bleeding is improving, awaitng pelvic Us: TA and TV, TTE is benign    MEDICATIONS  (STANDING):  amiodarone    Tablet 200 milliGRAM(s) Oral daily  carvedilol 6.25 milliGRAM(s) Oral every 12 hours  cholecalciferol 1000 Unit(s) Oral daily  dextrose 50% Injectable 12.5 Gram(s) IV Push once  dextrose 50% Injectable 25 Gram(s) IV Push once  dextrose 50% Injectable 25 Gram(s) IV Push once  docusate sodium 100 milliGRAM(s) Oral daily  DULoxetine 60 milliGRAM(s) Oral daily  insulin glargine Injectable (LANTUS) 24 Unit(s) SubCutaneous at bedtime  insulin lispro (HumaLOG) corrective regimen sliding scale   SubCutaneous three times a day before meals  insulin lispro (HumaLOG) corrective regimen sliding scale   SubCutaneous at bedtime  insulin lispro Injectable (HumaLOG) 8 Unit(s) SubCutaneous three times a day before meals  isosorbide   mononitrate ER Tablet (IMDUR) 30 milliGRAM(s) Oral daily  levothyroxine 175 MICROGram(s) Oral daily  mesalamine DR (24-Hour) Tablet 2.4 Gram(s) Oral daily  metolazone 2.5 milliGRAM(s) Oral <User Schedule>  norethindrone acetate 5 milliGRAM(s) Oral two times a day  pantoprazole    Tablet 40 milliGRAM(s) Oral before breakfast  senna 2 Tablet(s) Oral at bedtime  spironolactone 25 milliGRAM(s) Oral two times a day  torsemide 40 milliGRAM(s) Oral two times a day    MEDICATIONS  (PRN):  cyclobenzaprine 5 milliGRAM(s) Oral three times a day PRN Muscle Spasm  dextrose 40% Gel 15 Gram(s) Oral once PRN Blood Glucose LESS THAN 70 milliGRAM(s)/deciliter  glucagon  Injectable 1 milliGRAM(s) IntraMuscular once PRN Glucose LESS THAN 70 milligrams/deciliter      Vital Signs Last 24 Hrs  T(F): 98.3 (03-26-19 @ 04:57), Max: 98.8 (03-25-19 @ 21:33)  HR: 102 (03-26-19 @ 04:57) (101 - 102)  BP: 144/68 (03-26-19 @ 04:57) (118/59 - 148/80)  RR: 20 (03-26-19 @ 04:57) (20 - 20)  SpO2: 100% (03-26-19 @ 04:57) (100% - 100%)  Telemetry:   CAPILLARY BLOOD GLUCOSE      POCT Blood Glucose.: 265 mg/dL (26 Mar 2019 07:18)  POCT Blood Glucose.: 315 mg/dL (25 Mar 2019 21:20)  POCT Blood Glucose.: 266 mg/dL (25 Mar 2019 17:52)    I&O's Summary    25 Mar 2019 07:01  -  26 Mar 2019 07:00  --------------------------------------------------------  IN: 740 mL / OUT: 0 mL / NET: 740 mL    26 Mar 2019 07:01  -  26 Mar 2019 12:24  --------------------------------------------------------  IN: 360 mL / OUT: 0 mL / NET: 360 mL        PHYSICAL EXAM:  GENERAL: NAD, well-developed  HEAD:  Atraumatic, Normocephalic  EYES: EOMI, PERRLA, conjunctiva and sclera clear  NECK: Supple, No JVD  CHEST/LUNG: Clear to auscultation bilaterally; No wheeze  HEART: Regular rate and rhythm; No murmurs, rubs, or gallops  ABDOMEN: Soft, Nontender, Nondistended; Bowel sounds present  EXTREMITIES:  2+ Peripheral Pulses, No clubbing, cyanosis, or edema  PSYCH: AAOx3  NEUROLOGY: non-focal  SKIN: No rashes or lesions    LABS:                        8.3    10.1  )-----------( 154      ( 26 Mar 2019 04:59 )             25.6     03-26    139  |  98  |  46<H>  ----------------------------<  271<H>  3.6   |  28  |  1.76<H>    Ca    9.3      26 Mar 2019 04:59  Mg     2.2     03-26      PT/INR - ( 26 Mar 2019 04:59 )   PT: 16.3 sec;   INR: 1.41 ratio         PTT - ( 26 Mar 2019 04:59 )  PTT:28.8 sec          RADIOLOGY & ADDITIONAL TESTS:    Imaging Personally Reviewed:    Consultant(s) Notes Reviewed:      Care Discussed with Consultants/Other Providers:

## 2023-08-16 ENCOUNTER — OFFICE VISIT (OUTPATIENT)
Dept: OPHTHALMOLOGY | Facility: CLINIC | Age: 33
End: 2023-08-16
Payer: COMMERCIAL

## 2023-08-16 DIAGNOSIS — H52.13 MYOPIA OF BOTH EYES: ICD-10-CM

## 2023-08-16 DIAGNOSIS — H35.363 RETINAL DRUSEN OF BOTH EYES: Primary | ICD-10-CM

## 2023-08-16 PROCEDURE — 92015 DETERMINE REFRACTIVE STATE: CPT | Performed by: OPTOMETRIST

## 2023-08-16 PROCEDURE — 92134 CPTRZ OPH DX IMG PST SGM RTA: CPT | Performed by: OPTOMETRIST

## 2023-08-16 PROCEDURE — 92004 COMPRE OPH EXAM NEW PT 1/>: CPT | Performed by: OPTOMETRIST

## 2023-08-16 ASSESSMENT — CONF VISUAL FIELD
METHOD: COUNTING FINGERS
OS_INFERIOR_NASAL_RESTRICTION: 0
OD_INFERIOR_NASAL_RESTRICTION: 0
OD_SUPERIOR_TEMPORAL_RESTRICTION: 0
OS_SUPERIOR_TEMPORAL_RESTRICTION: 0
OD_SUPERIOR_NASAL_RESTRICTION: 0
OS_INFERIOR_TEMPORAL_RESTRICTION: 0
OD_INFERIOR_TEMPORAL_RESTRICTION: 0
OD_NORMAL: 1
OS_SUPERIOR_NASAL_RESTRICTION: 0
OS_NORMAL: 1

## 2023-08-16 ASSESSMENT — TONOMETRY
OS_IOP_MMHG: 11
IOP_METHOD: ICARE
OD_IOP_MMHG: 11

## 2023-08-16 ASSESSMENT — REFRACTION
OS_SPHERE: -0.25
OD_SPHERE: -0.50
OS_CYLINDER: SPHERE
OD_CYLINDER: SPHERE

## 2023-08-16 ASSESSMENT — VISUAL ACUITY
OD_SC: 20/20
OS_SC: 20/20
OS_SC+: -1
METHOD: SNELLEN - LINEAR

## 2023-08-16 ASSESSMENT — SLIT LAMP EXAM - LIDS
COMMENTS: NORMAL
COMMENTS: NORMAL

## 2023-08-16 ASSESSMENT — EXTERNAL EXAM - RIGHT EYE: OD_EXAM: NORMAL

## 2023-08-16 ASSESSMENT — CUP TO DISC RATIO
OD_RATIO: 0.2
OS_RATIO: 0.2

## 2023-08-16 ASSESSMENT — EXTERNAL EXAM - LEFT EYE: OS_EXAM: NORMAL

## 2023-08-16 NOTE — PROGRESS NOTES
History  HPI       COMPREHENSIVE EYE EXAM    In both eyes.  Since onset it is stable.  Associated symptoms include dryness (intermittent) and eye pain (infrequent shooting pain in left eye).  Negative for flashes and floaters.  Treatments tried include no treatments.  Pain was noted as 0/10 (None currently).             Comments    Patient states that in past exams she has had OCT images due to drusen being found on her maculas.  She states that her vision has seemed stable in both eyes since her last eye exam in 2021.   She did a genetic study ( and Me) and it stated that she is likely to develop macular degeneration with aging.       She is 34 weeks pregnant.    HARRY Renee 1:49 PM  August 16, 2023                  Last edited by Barbie Reed COT on 8/16/2023  1:49 PM.          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 No treatment indicated at this time. Monitor annually.   Discussed diet rich in leafy greens. Consider baseline fundus photos at next visit.    (H52.13) Myopia of both eyes  Comment: Myopia both eyes   Plan: REFRACTION         Dispensed spectacle prescription for as-needed wear. Monitor annually.    Return to clinic in 1 year for comprehensive eye exam.    Complete documentation of historical and exam elements from today's encounter can  be found in the full encounter summary report (not reduplicated in this progress  note). I personally obtained the chief complaint(s) and history of present illness. I  confirmed and edited as necessary the review of systems, past medical/surgical  history, family history, social history, and examination findings as documented by  others; and I examined the patient  myself. I personally reviewed the relevant tests,  images, and reports as documented above. I formulated and edited as necessary the  assessment and plan and discussed the findings and management plan with the  patient and family.    Johnny Tan OD, FAAO

## 2023-08-16 NOTE — NURSING NOTE
Chief Complaints and History of Present Illnesses   Patient presents with    COMPREHENSIVE EYE EXAM     Chief Complaint(s) and History of Present Illness(es)       COMPREHENSIVE EYE EXAM              Laterality: both eyes    Course: stable    Associated symptoms: dryness (intermittent) and eye pain (infrequent shooting pain in left eye).  Negative for flashes and floaters    Treatments tried: no treatments    Pain scale: 0/10 (None currently)              Comments    Patient states that in past exams she has had OCT images due to drusen being found on her maculas.  She states that her vision has seemed stable in both eyes since her last eye exam in 2021.   She did a genetic study ( and Me) and it stated that she is likely to develop macular degeneration with aging.       She is 34 weeks pregnant.    Barbie Reed, COT 1:49 PM  August 16, 2023

## 2023-08-18 NOTE — TELEPHONE ENCOUNTER
Patient due for Pap and HPV.    Reminder done today via telephone call-spoke to the pt she said that she is currently 34 weeks pregnant. EDC 09/25/23 she will plan on doing this at her 6 week postpartum appt.

## 2023-08-25 ENCOUNTER — HOSPITAL ENCOUNTER (OUTPATIENT)
Dept: ULTRASOUND IMAGING | Facility: CLINIC | Age: 33
Discharge: HOME | End: 2023-08-25
Attending: STUDENT IN AN ORGANIZED HEALTH CARE EDUCATION/TRAINING PROGRAM
Payer: COMMERCIAL

## 2023-08-25 ENCOUNTER — OFFICE VISIT (OUTPATIENT)
Dept: MATERNAL FETAL MEDICINE | Facility: CLINIC | Age: 33
End: 2023-08-25
Attending: STUDENT IN AN ORGANIZED HEALTH CARE EDUCATION/TRAINING PROGRAM
Payer: COMMERCIAL

## 2023-08-25 DIAGNOSIS — Z36.2 ENCOUNTER FOR FOLLOW-UP ULTRASOUND OF FETAL ANATOMY: ICD-10-CM

## 2023-08-25 DIAGNOSIS — O35.BXX1 MATERNAL CARE FOR OTHER (SUSPECTED) FETAL ABNORMALITY AND DAMAGE, FETAL CARDIAC ANOMALIES, FETUS 1: ICD-10-CM

## 2023-08-25 DIAGNOSIS — O36.5939 IUGR (INTRAUTERINE GROWTH RESTRICTION) AFFECTING CARE OF MOTHER, THIRD TRIMESTER, OTHER FETUS: Primary | ICD-10-CM

## 2023-08-25 PROCEDURE — 76820 UMBILICAL ARTERY ECHO: CPT | Mod: 26 | Performed by: OBSTETRICS & GYNECOLOGY

## 2023-08-25 PROCEDURE — 59025 FETAL NON-STRESS TEST: CPT

## 2023-08-25 PROCEDURE — 76816 OB US FOLLOW-UP PER FETUS: CPT | Mod: 26 | Performed by: OBSTETRICS & GYNECOLOGY

## 2023-08-25 PROCEDURE — 76816 OB US FOLLOW-UP PER FETUS: CPT

## 2023-08-25 NOTE — NURSING NOTE
Patient presents to [facility] for RL2 at 35w4d due to subopt and VCI. Positive fetal movement. Denies LOF, vaginal bleeding or cramping/contractions. SBAR given to YOEL MD, see their note in Epic.

## 2023-08-25 NOTE — PROGRESS NOTES
"Please see \"Imaging\" tab under \"Chart Review\" for details of today's US at the [facility].    Warren Garcia MD  Maternal-Fetal Medicine    "

## 2023-08-30 ENCOUNTER — HOSPITAL ENCOUNTER (OUTPATIENT)
Dept: CARDIOLOGY | Facility: CLINIC | Age: 33
Discharge: HOME | End: 2023-08-30
Attending: OBSTETRICS & GYNECOLOGY | Admitting: OBSTETRICS & GYNECOLOGY
Payer: COMMERCIAL

## 2023-08-30 DIAGNOSIS — O35.BXX1 MATERNAL CARE FOR OTHER (SUSPECTED) FETAL ABNORMALITY AND DAMAGE, FETAL CARDIAC ANOMALIES, FETUS 1: ICD-10-CM

## 2023-08-30 PROCEDURE — 93325 DOPPLER ECHO COLOR FLOW MAPG: CPT | Mod: 26 | Performed by: PEDIATRICS

## 2023-08-30 PROCEDURE — 93325 DOPPLER ECHO COLOR FLOW MAPG: CPT

## 2023-08-30 PROCEDURE — 76825 ECHO EXAM OF FETAL HEART: CPT | Mod: 26 | Performed by: PEDIATRICS

## 2023-08-30 PROCEDURE — 76827 ECHO EXAM OF FETAL HEART: CPT | Mod: 26 | Performed by: PEDIATRICS

## 2023-08-31 ENCOUNTER — HOSPITAL ENCOUNTER (OUTPATIENT)
Dept: ULTRASOUND IMAGING | Facility: CLINIC | Age: 33
Discharge: HOME | End: 2023-08-31
Attending: STUDENT IN AN ORGANIZED HEALTH CARE EDUCATION/TRAINING PROGRAM
Payer: COMMERCIAL

## 2023-08-31 ENCOUNTER — OFFICE VISIT (OUTPATIENT)
Dept: MATERNAL FETAL MEDICINE | Facility: CLINIC | Age: 33
End: 2023-08-31
Attending: STUDENT IN AN ORGANIZED HEALTH CARE EDUCATION/TRAINING PROGRAM
Payer: COMMERCIAL

## 2023-08-31 DIAGNOSIS — O36.5939 IUGR (INTRAUTERINE GROWTH RESTRICTION) AFFECTING CARE OF MOTHER, THIRD TRIMESTER, OTHER FETUS: Primary | ICD-10-CM

## 2023-08-31 DIAGNOSIS — O36.5939 IUGR (INTRAUTERINE GROWTH RESTRICTION) AFFECTING CARE OF MOTHER, THIRD TRIMESTER, OTHER FETUS: ICD-10-CM

## 2023-08-31 PROCEDURE — 76820 UMBILICAL ARTERY ECHO: CPT | Mod: 26 | Performed by: STUDENT IN AN ORGANIZED HEALTH CARE EDUCATION/TRAINING PROGRAM

## 2023-08-31 PROCEDURE — 76815 OB US LIMITED FETUS(S): CPT | Mod: 26 | Performed by: STUDENT IN AN ORGANIZED HEALTH CARE EDUCATION/TRAINING PROGRAM

## 2023-08-31 PROCEDURE — 76818 FETAL BIOPHYS PROFILE W/NST: CPT

## 2023-08-31 PROCEDURE — 76818 FETAL BIOPHYS PROFILE W/NST: CPT | Mod: 26 | Performed by: STUDENT IN AN ORGANIZED HEALTH CARE EDUCATION/TRAINING PROGRAM

## 2023-08-31 NOTE — NURSING NOTE
Patient presents to [facility] for Limited/UAR at 36w3d due to fetal growth restriction. Positive fetal movement. Denies LOF, vaginal bleeding or cramping/contractions. SBAR given to MFM MD, see their note in Epic. NST performed. Dr. Bolton reviewed efm tracing. See NST/BPP Doc Flowsheet tab. Continue weekly surveillance with MFM.

## 2023-09-06 ENCOUNTER — OFFICE VISIT (OUTPATIENT)
Dept: MATERNAL FETAL MEDICINE | Facility: CLINIC | Age: 33
End: 2023-09-06
Attending: OBSTETRICS & GYNECOLOGY
Payer: COMMERCIAL

## 2023-09-06 ENCOUNTER — DOCUMENTATION ONLY (OUTPATIENT)
Dept: MATERNAL FETAL MEDICINE | Facility: CLINIC | Age: 33
End: 2023-09-06
Payer: COMMERCIAL

## 2023-09-06 ENCOUNTER — HOSPITAL ENCOUNTER (OUTPATIENT)
Dept: ULTRASOUND IMAGING | Facility: CLINIC | Age: 33
Discharge: HOME | End: 2023-09-06
Attending: OBSTETRICS & GYNECOLOGY
Payer: COMMERCIAL

## 2023-09-06 DIAGNOSIS — O36.5939 IUGR (INTRAUTERINE GROWTH RESTRICTION) AFFECTING CARE OF MOTHER, THIRD TRIMESTER, OTHER FETUS: Primary | ICD-10-CM

## 2023-09-06 DIAGNOSIS — O36.5939 IUGR (INTRAUTERINE GROWTH RESTRICTION) AFFECTING CARE OF MOTHER, THIRD TRIMESTER, OTHER FETUS: ICD-10-CM

## 2023-09-06 PROCEDURE — 59025 FETAL NON-STRESS TEST: CPT

## 2023-09-06 PROCEDURE — 76815 OB US LIMITED FETUS(S): CPT | Mod: 26 | Performed by: OBSTETRICS & GYNECOLOGY

## 2023-09-06 PROCEDURE — 76820 UMBILICAL ARTERY ECHO: CPT | Mod: 26 | Performed by: OBSTETRICS & GYNECOLOGY

## 2023-09-06 PROCEDURE — 76820 UMBILICAL ARTERY ECHO: CPT

## 2023-09-06 PROCEDURE — 59025 FETAL NON-STRESS TEST: CPT | Mod: 26 | Performed by: OBSTETRICS & GYNECOLOGY

## 2023-09-06 NOTE — NURSING NOTE
Patient presents to [facility] for NST/limited/UAR at 37w2d due to FGR. Positive fetal movement. Denies LOF, vaginal bleeding or cramping/contractions. SBAR given to M MD, see their note in Epic.  NST Performed due to FGR.   reviewed efm tracing. See NST/BPP Doc Flowsheet tab.

## 2023-09-06 NOTE — PROGRESS NOTES
Key Monzon MD verbalized to Agueda Vela RN in MFM that pt can deliver at [facility] as long as baby can get an echocardiogram within 48 hours after delivery.    Geneva Wilhelm RN

## 2023-09-07 NOTE — PROGRESS NOTES
"Please see \"Imaging\" tab under \"Chart Review\" for details of today's visit.    Vincenzo Ortega    "

## 2023-09-12 ENCOUNTER — ANESTHESIA (OUTPATIENT)
Dept: OBGYN | Facility: CLINIC | Age: 33
End: 2023-09-12
Payer: COMMERCIAL

## 2023-09-12 ENCOUNTER — HOSPITAL ENCOUNTER (INPATIENT)
Facility: CLINIC | Age: 33
LOS: 3 days | Discharge: HOME | End: 2023-09-15
Attending: OBSTETRICS & GYNECOLOGY | Admitting: OBSTETRICS & GYNECOLOGY
Payer: COMMERCIAL

## 2023-09-12 ENCOUNTER — ANESTHESIA EVENT (OUTPATIENT)
Dept: OBGYN | Facility: CLINIC | Age: 33
End: 2023-09-12
Payer: COMMERCIAL

## 2023-09-12 DIAGNOSIS — Z36.89 ENCOUNTER FOR TRIAGE IN PREGNANT PATIENT: Primary | ICD-10-CM

## 2023-09-12 LAB
ABO/RH(D): NORMAL
ANTIBODY SCREEN: NEGATIVE
HGB BLD-MCNC: 10.1 G/DL (ref 11.7–15.7)
SPECIMEN EXPIRATION DATE: NORMAL
T PALLIDUM AB SER QL: NONREACTIVE

## 2023-09-12 PROCEDURE — 250N000011 HC RX IP 250 OP 636: Performed by: ANESTHESIOLOGY

## 2023-09-12 PROCEDURE — 120N000001 HC R&B MED SURG/OB

## 2023-09-12 PROCEDURE — 250N000013 HC RX MED GY IP 250 OP 250 PS 637: Performed by: PHYSICIAN ASSISTANT

## 2023-09-12 PROCEDURE — 258N000003 HC RX IP 258 OP 636: Performed by: ANESTHESIOLOGY

## 2023-09-12 PROCEDURE — 86900 BLOOD TYPING SEROLOGIC ABO: CPT | Performed by: PHYSICIAN ASSISTANT

## 2023-09-12 PROCEDURE — 85018 HEMOGLOBIN: CPT | Performed by: PHYSICIAN ASSISTANT

## 2023-09-12 PROCEDURE — 250N000011 HC RX IP 250 OP 636: Performed by: PHYSICIAN ASSISTANT

## 2023-09-12 PROCEDURE — 258N000003 HC RX IP 258 OP 636: Performed by: PHYSICIAN ASSISTANT

## 2023-09-12 PROCEDURE — 250N000013 HC RX MED GY IP 250 OP 250 PS 637: Performed by: OBSTETRICS & GYNECOLOGY

## 2023-09-12 PROCEDURE — 250N000009 HC RX 250: Performed by: NURSE ANESTHETIST, CERTIFIED REGISTERED

## 2023-09-12 PROCEDURE — 370N000017 HC ANESTHESIA TECHNICAL FEE, PER MIN: Performed by: OBSTETRICS & GYNECOLOGY

## 2023-09-12 PROCEDURE — 250N000011 HC RX IP 250 OP 636: Mod: JZ | Performed by: OBSTETRICS & GYNECOLOGY

## 2023-09-12 PROCEDURE — 250N000011 HC RX IP 250 OP 636: Mod: JZ | Performed by: NURSE ANESTHETIST, CERTIFIED REGISTERED

## 2023-09-12 PROCEDURE — 710N000009 HC RECOVERY PHASE 1, LEVEL 1, PER MIN: Performed by: OBSTETRICS & GYNECOLOGY

## 2023-09-12 PROCEDURE — 272N000001 HC OR GENERAL SUPPLY STERILE: Performed by: OBSTETRICS & GYNECOLOGY

## 2023-09-12 PROCEDURE — 86780 TREPONEMA PALLIDUM: CPT | Performed by: PHYSICIAN ASSISTANT

## 2023-09-12 PROCEDURE — 258N000003 HC RX IP 258 OP 636: Performed by: NURSE ANESTHETIST, CERTIFIED REGISTERED

## 2023-09-12 PROCEDURE — 360N000076 HC SURGERY LEVEL 3, PER MIN: Performed by: OBSTETRICS & GYNECOLOGY

## 2023-09-12 RX ORDER — PROCHLORPERAZINE MALEATE 10 MG
10 TABLET ORAL EVERY 6 HOURS PRN
Status: DISCONTINUED | OUTPATIENT
Start: 2023-09-12 | End: 2023-09-15 | Stop reason: HOSPADM

## 2023-09-12 RX ORDER — CARBOPROST TROMETHAMINE 250 UG/ML
250 INJECTION, SOLUTION INTRAMUSCULAR
Status: DISCONTINUED | OUTPATIENT
Start: 2023-09-12 | End: 2023-09-12 | Stop reason: HOSPADM

## 2023-09-12 RX ORDER — KETOROLAC TROMETHAMINE 30 MG/ML
30 INJECTION, SOLUTION INTRAMUSCULAR; INTRAVENOUS EVERY 6 HOURS
Status: DISCONTINUED | OUTPATIENT
Start: 2023-09-12 | End: 2023-09-13 | Stop reason: ALTCHOICE

## 2023-09-12 RX ORDER — AMOXICILLIN 250 MG
1 CAPSULE ORAL 2 TIMES DAILY
Status: DISCONTINUED | OUTPATIENT
Start: 2023-09-12 | End: 2023-09-15 | Stop reason: HOSPADM

## 2023-09-12 RX ORDER — OXYTOCIN/0.9 % SODIUM CHLORIDE 30/500 ML
340 PLASTIC BAG, INJECTION (ML) INTRAVENOUS CONTINUOUS PRN
Status: DISCONTINUED | OUTPATIENT
Start: 2023-09-12 | End: 2023-09-15 | Stop reason: HOSPADM

## 2023-09-12 RX ORDER — NALOXONE HYDROCHLORIDE 0.4 MG/ML
0.2 INJECTION, SOLUTION INTRAMUSCULAR; INTRAVENOUS; SUBCUTANEOUS
Status: DISCONTINUED | OUTPATIENT
Start: 2023-09-12 | End: 2023-09-15 | Stop reason: HOSPADM

## 2023-09-12 RX ORDER — OXYTOCIN 10 [USP'U]/ML
10 INJECTION, SOLUTION INTRAMUSCULAR; INTRAVENOUS
Status: DISCONTINUED | OUTPATIENT
Start: 2023-09-12 | End: 2023-09-12 | Stop reason: HOSPADM

## 2023-09-12 RX ORDER — MISOPROSTOL 200 UG/1
800 TABLET ORAL
Status: DISCONTINUED | OUTPATIENT
Start: 2023-09-12 | End: 2023-09-15 | Stop reason: HOSPADM

## 2023-09-12 RX ORDER — CEFAZOLIN SODIUM/WATER 2 G/20 ML
2 SYRINGE (ML) INTRAVENOUS SEE ADMIN INSTRUCTIONS
Status: DISCONTINUED | OUTPATIENT
Start: 2023-09-12 | End: 2023-09-12 | Stop reason: HOSPADM

## 2023-09-12 RX ORDER — SODIUM CHLORIDE, SODIUM LACTATE, POTASSIUM CHLORIDE, CALCIUM CHLORIDE 600; 310; 30; 20 MG/100ML; MG/100ML; MG/100ML; MG/100ML
INJECTION, SOLUTION INTRAVENOUS CONTINUOUS
Status: DISCONTINUED | OUTPATIENT
Start: 2023-09-12 | End: 2023-09-12 | Stop reason: HOSPADM

## 2023-09-12 RX ORDER — MISOPROSTOL 200 UG/1
800 TABLET ORAL
Status: DISCONTINUED | OUTPATIENT
Start: 2023-09-12 | End: 2023-09-12 | Stop reason: HOSPADM

## 2023-09-12 RX ORDER — HYDROCORTISONE 25 MG/G
CREAM TOPICAL 3 TIMES DAILY PRN
Status: DISCONTINUED | OUTPATIENT
Start: 2023-09-12 | End: 2023-09-15 | Stop reason: HOSPADM

## 2023-09-12 RX ORDER — AMOXICILLIN 250 MG
2 CAPSULE ORAL 2 TIMES DAILY
Status: DISCONTINUED | OUTPATIENT
Start: 2023-09-12 | End: 2023-09-15 | Stop reason: HOSPADM

## 2023-09-12 RX ORDER — FENTANYL CITRATE-0.9 % NACL/PF 10 MCG/ML
100 PLASTIC BAG, INJECTION (ML) INTRAVENOUS EVERY 5 MIN PRN
Status: DISCONTINUED | OUTPATIENT
Start: 2023-09-12 | End: 2023-09-12 | Stop reason: HOSPADM

## 2023-09-12 RX ORDER — DEXTROSE, SODIUM CHLORIDE, SODIUM LACTATE, POTASSIUM CHLORIDE, AND CALCIUM CHLORIDE 5; .6; .31; .03; .02 G/100ML; G/100ML; G/100ML; G/100ML; G/100ML
INJECTION, SOLUTION INTRAVENOUS CONTINUOUS
Status: DISCONTINUED | OUTPATIENT
Start: 2023-09-12 | End: 2023-09-15 | Stop reason: HOSPADM

## 2023-09-12 RX ORDER — BISACODYL 10 MG
10 SUPPOSITORY, RECTAL RECTAL DAILY PRN
Status: DISCONTINUED | OUTPATIENT
Start: 2023-09-14 | End: 2023-09-15 | Stop reason: HOSPADM

## 2023-09-12 RX ORDER — ONDANSETRON 2 MG/ML
INJECTION INTRAMUSCULAR; INTRAVENOUS PRN
Status: DISCONTINUED | OUTPATIENT
Start: 2023-09-12 | End: 2023-09-12

## 2023-09-12 RX ORDER — CITRIC ACID/SODIUM CITRATE 334-500MG
30 SOLUTION, ORAL ORAL
Status: COMPLETED | OUTPATIENT
Start: 2023-09-12 | End: 2023-09-12

## 2023-09-12 RX ORDER — LIDOCAINE 40 MG/G
CREAM TOPICAL
Status: DISCONTINUED | OUTPATIENT
Start: 2023-09-12 | End: 2023-09-12 | Stop reason: HOSPADM

## 2023-09-12 RX ORDER — NALBUPHINE HYDROCHLORIDE 20 MG/ML
2.5-5 INJECTION, SOLUTION INTRAMUSCULAR; INTRAVENOUS; SUBCUTANEOUS EVERY 6 HOURS PRN
Status: DISCONTINUED | OUTPATIENT
Start: 2023-09-12 | End: 2023-09-15 | Stop reason: HOSPADM

## 2023-09-12 RX ORDER — OXYCODONE HYDROCHLORIDE 5 MG/1
5 TABLET ORAL EVERY 4 HOURS PRN
Status: DISCONTINUED | OUTPATIENT
Start: 2023-09-12 | End: 2023-09-15 | Stop reason: HOSPADM

## 2023-09-12 RX ORDER — PROCHLORPERAZINE 25 MG
25 SUPPOSITORY, RECTAL RECTAL EVERY 12 HOURS PRN
Status: DISCONTINUED | OUTPATIENT
Start: 2023-09-12 | End: 2023-09-15 | Stop reason: HOSPADM

## 2023-09-12 RX ORDER — METHYLERGONOVINE MALEATE 0.2 MG/ML
200 INJECTION INTRAVENOUS
Status: DISCONTINUED | OUTPATIENT
Start: 2023-09-12 | End: 2023-09-12 | Stop reason: HOSPADM

## 2023-09-12 RX ORDER — OXYTOCIN/0.9 % SODIUM CHLORIDE 30/500 ML
PLASTIC BAG, INJECTION (ML) INTRAVENOUS PRN
Status: DISCONTINUED | OUTPATIENT
Start: 2023-09-12 | End: 2023-09-12

## 2023-09-12 RX ORDER — NALOXONE HYDROCHLORIDE 0.4 MG/ML
0.4 INJECTION, SOLUTION INTRAMUSCULAR; INTRAVENOUS; SUBCUTANEOUS
Status: DISCONTINUED | OUTPATIENT
Start: 2023-09-12 | End: 2023-09-15 | Stop reason: HOSPADM

## 2023-09-12 RX ORDER — TRANEXAMIC ACID 10 MG/ML
1 INJECTION, SOLUTION INTRAVENOUS EVERY 30 MIN PRN
Status: DISCONTINUED | OUTPATIENT
Start: 2023-09-12 | End: 2023-09-12 | Stop reason: HOSPADM

## 2023-09-12 RX ORDER — TRANEXAMIC ACID 10 MG/ML
1 INJECTION, SOLUTION INTRAVENOUS EVERY 30 MIN PRN
Status: DISCONTINUED | OUTPATIENT
Start: 2023-09-12 | End: 2023-09-15 | Stop reason: HOSPADM

## 2023-09-12 RX ORDER — ONDANSETRON 2 MG/ML
4 INJECTION INTRAMUSCULAR; INTRAVENOUS EVERY 6 HOURS PRN
Status: DISCONTINUED | OUTPATIENT
Start: 2023-09-12 | End: 2023-09-15 | Stop reason: HOSPADM

## 2023-09-12 RX ORDER — ONDANSETRON 4 MG/1
4 TABLET, ORALLY DISINTEGRATING ORAL EVERY 6 HOURS PRN
Status: DISCONTINUED | OUTPATIENT
Start: 2023-09-12 | End: 2023-09-15 | Stop reason: HOSPADM

## 2023-09-12 RX ORDER — MISOPROSTOL 200 UG/1
400 TABLET ORAL
Status: DISCONTINUED | OUTPATIENT
Start: 2023-09-12 | End: 2023-09-12 | Stop reason: HOSPADM

## 2023-09-12 RX ORDER — MODIFIED LANOLIN
OINTMENT (GRAM) TOPICAL
Status: DISCONTINUED | OUTPATIENT
Start: 2023-09-12 | End: 2023-09-15 | Stop reason: HOSPADM

## 2023-09-12 RX ORDER — LIDOCAINE 40 MG/G
CREAM TOPICAL
Status: DISCONTINUED | OUTPATIENT
Start: 2023-09-12 | End: 2023-09-15 | Stop reason: HOSPADM

## 2023-09-12 RX ORDER — FENTANYL CITRATE 50 UG/ML
INJECTION, SOLUTION INTRAMUSCULAR; INTRAVENOUS
Status: COMPLETED | OUTPATIENT
Start: 2023-09-12 | End: 2023-09-12

## 2023-09-12 RX ORDER — IBUPROFEN 800 MG/1
800 TABLET, FILM COATED ORAL EVERY 6 HOURS
Status: DISCONTINUED | OUTPATIENT
Start: 2023-09-13 | End: 2023-09-13

## 2023-09-12 RX ORDER — BUPIVACAINE HYDROCHLORIDE 7.5 MG/ML
INJECTION, SOLUTION INTRASPINAL
Status: COMPLETED | OUTPATIENT
Start: 2023-09-12 | End: 2023-09-12

## 2023-09-12 RX ORDER — OXYTOCIN/0.9 % SODIUM CHLORIDE 30/500 ML
340 PLASTIC BAG, INJECTION (ML) INTRAVENOUS CONTINUOUS PRN
Status: DISCONTINUED | OUTPATIENT
Start: 2023-09-12 | End: 2023-09-12 | Stop reason: HOSPADM

## 2023-09-12 RX ORDER — ACETAMINOPHEN 325 MG/1
975 TABLET ORAL EVERY 6 HOURS
Status: DISCONTINUED | OUTPATIENT
Start: 2023-09-12 | End: 2023-09-15 | Stop reason: HOSPADM

## 2023-09-12 RX ORDER — OXYTOCIN 10 [USP'U]/ML
10 INJECTION, SOLUTION INTRAMUSCULAR; INTRAVENOUS
Status: DISCONTINUED | OUTPATIENT
Start: 2023-09-12 | End: 2023-09-15 | Stop reason: HOSPADM

## 2023-09-12 RX ORDER — MISOPROSTOL 200 UG/1
400 TABLET ORAL
Status: DISCONTINUED | OUTPATIENT
Start: 2023-09-12 | End: 2023-09-15 | Stop reason: HOSPADM

## 2023-09-12 RX ORDER — OXYTOCIN/0.9 % SODIUM CHLORIDE 30/500 ML
100-340 PLASTIC BAG, INJECTION (ML) INTRAVENOUS CONTINUOUS PRN
Status: DISCONTINUED | OUTPATIENT
Start: 2023-09-12 | End: 2023-09-15 | Stop reason: HOSPADM

## 2023-09-12 RX ORDER — ACETAMINOPHEN 325 MG/1
975 TABLET ORAL ONCE
Status: COMPLETED | OUTPATIENT
Start: 2023-09-12 | End: 2023-09-12

## 2023-09-12 RX ORDER — METOCLOPRAMIDE 10 MG/1
10 TABLET ORAL EVERY 6 HOURS PRN
Status: DISCONTINUED | OUTPATIENT
Start: 2023-09-12 | End: 2023-09-15 | Stop reason: HOSPADM

## 2023-09-12 RX ORDER — METOCLOPRAMIDE HYDROCHLORIDE 5 MG/ML
10 INJECTION INTRAMUSCULAR; INTRAVENOUS EVERY 6 HOURS PRN
Status: DISCONTINUED | OUTPATIENT
Start: 2023-09-12 | End: 2023-09-15 | Stop reason: HOSPADM

## 2023-09-12 RX ORDER — KETOROLAC TROMETHAMINE 30 MG/ML
INJECTION, SOLUTION INTRAMUSCULAR; INTRAVENOUS PRN
Status: DISCONTINUED | OUTPATIENT
Start: 2023-09-12 | End: 2023-09-12

## 2023-09-12 RX ORDER — MORPHINE SULFATE 1 MG/ML
INJECTION, SOLUTION EPIDURAL; INTRATHECAL; INTRAVENOUS
Status: COMPLETED | OUTPATIENT
Start: 2023-09-12 | End: 2023-09-12

## 2023-09-12 RX ORDER — CEFAZOLIN SODIUM/WATER 2 G/20 ML
2 SYRINGE (ML) INTRAVENOUS
Status: COMPLETED | OUTPATIENT
Start: 2023-09-12 | End: 2023-09-12

## 2023-09-12 RX ORDER — SIMETHICONE 80 MG
80 TABLET,CHEWABLE ORAL 4 TIMES DAILY PRN
Status: DISCONTINUED | OUTPATIENT
Start: 2023-09-12 | End: 2023-09-15 | Stop reason: HOSPADM

## 2023-09-12 RX ORDER — CARBOPROST TROMETHAMINE 250 UG/ML
250 INJECTION, SOLUTION INTRAMUSCULAR
Status: DISCONTINUED | OUTPATIENT
Start: 2023-09-12 | End: 2023-09-15 | Stop reason: HOSPADM

## 2023-09-12 RX ORDER — METHYLERGONOVINE MALEATE 0.2 MG/ML
200 INJECTION INTRAVENOUS
Status: DISCONTINUED | OUTPATIENT
Start: 2023-09-12 | End: 2023-09-15 | Stop reason: HOSPADM

## 2023-09-12 RX ADMIN — BUPIVACAINE HYDROCHLORIDE IN DEXTROSE 1.6 ML: 7.5 INJECTION, SOLUTION SUBARACHNOID at 07:33

## 2023-09-12 RX ADMIN — SODIUM CHLORIDE, POTASSIUM CHLORIDE, SODIUM LACTATE AND CALCIUM CHLORIDE: 600; 310; 30; 20 INJECTION, SOLUTION INTRAVENOUS at 07:29

## 2023-09-12 RX ADMIN — KETOROLAC TROMETHAMINE 30 MG: 30 INJECTION, SOLUTION INTRAMUSCULAR at 07:58

## 2023-09-12 RX ADMIN — KETOROLAC TROMETHAMINE 30 MG: 30 INJECTION, SOLUTION INTRAMUSCULAR at 22:43

## 2023-09-12 RX ADMIN — SODIUM CITRATE AND CITRIC ACID MONOHYDRATE 30 ML: 500; 334 SOLUTION ORAL at 06:43

## 2023-09-12 RX ADMIN — ACETAMINOPHEN 975 MG: 325 TABLET, FILM COATED ORAL at 13:00

## 2023-09-12 RX ADMIN — OXYCODONE HYDROCHLORIDE 5 MG: 5 TABLET ORAL at 22:48

## 2023-09-12 RX ADMIN — FENTANYL CITRATE 15 MCG: 50 INJECTION, SOLUTION INTRAMUSCULAR; INTRAVENOUS at 07:33

## 2023-09-12 RX ADMIN — Medication 0.2 MG: at 07:33

## 2023-09-12 RX ADMIN — ACETAMINOPHEN 975 MG: 325 TABLET, FILM COATED ORAL at 23:53

## 2023-09-12 RX ADMIN — Medication 2 G: at 07:29

## 2023-09-12 RX ADMIN — PHENYLEPHRINE HYDROCHLORIDE 0.3 MCG/KG/MIN: 10 INJECTION INTRAVENOUS at 07:36

## 2023-09-12 RX ADMIN — OXYTOCIN-SODIUM CHLORIDE 0.9% IV SOLN 30 UNIT/500ML 100 ML: 30-0.9/5 SOLUTION at 07:51

## 2023-09-12 RX ADMIN — OXYCODONE HYDROCHLORIDE 5 MG: 5 TABLET ORAL at 18:47

## 2023-09-12 RX ADMIN — SODIUM CHLORIDE, POTASSIUM CHLORIDE, SODIUM LACTATE AND CALCIUM CHLORIDE 500 ML: 600; 310; 30; 20 INJECTION, SOLUTION INTRAVENOUS at 06:12

## 2023-09-12 RX ADMIN — SODIUM CHLORIDE, POTASSIUM CHLORIDE, SODIUM LACTATE AND CALCIUM CHLORIDE 250 ML: 600; 310; 30; 20 INJECTION, SOLUTION INTRAVENOUS at 08:48

## 2023-09-12 RX ADMIN — SODIUM CHLORIDE, SODIUM LACTATE, POTASSIUM CHLORIDE, CALCIUM CHLORIDE, AND DEXTROSE MONOHYDRATE: 600; 310; 30; 20; 5 INJECTION, SOLUTION INTRAVENOUS at 11:39

## 2023-09-12 RX ADMIN — PHENYLEPHRINE HYDROCHLORIDE 100 MCG: 10 INJECTION INTRAVENOUS at 07:58

## 2023-09-12 RX ADMIN — SODIUM CHLORIDE, POTASSIUM CHLORIDE, SODIUM LACTATE AND CALCIUM CHLORIDE: 600; 310; 30; 20 INJECTION, SOLUTION INTRAVENOUS at 07:35

## 2023-09-12 RX ADMIN — ACETAMINOPHEN 975 MG: 325 TABLET, FILM COATED ORAL at 06:42

## 2023-09-12 RX ADMIN — SODIUM CHLORIDE, POTASSIUM CHLORIDE, SODIUM LACTATE AND CALCIUM CHLORIDE 500 ML: 600; 310; 30; 20 INJECTION, SOLUTION INTRAVENOUS at 10:11

## 2023-09-12 RX ADMIN — KETOROLAC TROMETHAMINE 30 MG: 30 INJECTION, SOLUTION INTRAMUSCULAR at 17:33

## 2023-09-12 RX ADMIN — PHENYLEPHRINE HYDROCHLORIDE 100 MCG: 10 INJECTION INTRAVENOUS at 07:36

## 2023-09-12 RX ADMIN — ONDANSETRON 4 MG: 2 INJECTION INTRAMUSCULAR; INTRAVENOUS at 07:41

## 2023-09-12 RX ADMIN — OXYTOCIN-SODIUM CHLORIDE 0.9% IV SOLN 30 UNIT/500ML 100 ML: 30-0.9/5 SOLUTION at 08:12

## 2023-09-12 RX ADMIN — SENNOSIDES AND DOCUSATE SODIUM 1 TABLET: 8.6; 5 TABLET ORAL at 20:47

## 2023-09-12 RX ADMIN — PHENYLEPHRINE HYDROCHLORIDE 100 MCG: 10 INJECTION INTRAVENOUS at 07:41

## 2023-09-12 RX ADMIN — OXYCODONE HYDROCHLORIDE 5 MG: 5 TABLET ORAL at 14:25

## 2023-09-12 RX ADMIN — ACETAMINOPHEN 975 MG: 325 TABLET, FILM COATED ORAL at 18:48

## 2023-09-12 ASSESSMENT — ACTIVITIES OF DAILY LIVING (ADL)
ADLS_ACUITY_SCORE: 18

## 2023-09-12 NOTE — ANESTHESIA PREPROCEDURE EVALUATION
Anesthesia Pre-Procedure Evaluation    Patient: Elaine Galloway   MRN: [id number] : 1990        Procedure : Procedure(s):  PRIMARY  SECTION          Past Medical History:   Diagnosis Date    Abnormal Pap smear ,     colp 1/10 LINDA I    ASCUS favor benign 2014    Neg HPV, pap in 3 years    Controlled substance agreement signed 2014    Environmental allergies       Past Surgical History:   Procedure Laterality Date    BIOPSY      Irregular Pap Biopsy    HC REMOVE TONSILS/ADENOIDS,<10 Y/O      T & A <11y.o.    LEG SURGERY      ORTHOPEDIC SURGERY   &     Left Lower Tibia & Fibia, Right Shoulder- Labreal Tear    SHOULDER SURGERY      TONSILLECTOMY & ADENOIDECTOMY        Allergies   Allergen Reactions    Adhesive Tape Blisters    Medical Adhesive Remover Itching     Can also get blisters    Sulfa Antibiotics       Social History     Tobacco Use    Smoking status: Never     Passive exposure: Past    Smokeless tobacco: Never   Substance Use Topics    Alcohol use: Not Currently     Alcohol/week: 3.0 standard drinks of alcohol     Types: 3 Cans of beer per week      Wt Readings from Last 1 Encounters:   23 79.8 kg (176 lb)        Anesthesia Evaluation        No history of anesthetic complications       ROS/MED HX  ENT/Pulmonary:       Neurologic:       Cardiovascular:       METS/Exercise Tolerance:     Hematologic:     (+)      anemia,          Musculoskeletal:   (+)       scoliosis (mild scoliosis per patient, levels unknown),        GI/Hepatic:       Renal/Genitourinary:       Endo:       Psychiatric/Substance Use:       Infectious Disease:       Malignancy:       Other:   Primary c section 2/2 breech presentation  oligohydramnios  Suspected fetal cardiac anomaly  (-) previous        Physical Exam    Airway        Mallampati: II   TM distance: > 3 FB   Neck ROM: full   Mouth opening: > 3 cm    Respiratory Devices and Support         Dental            Cardiovascular   cardiovascular exam normal          Pulmonary   pulmonary exam normal                OUTSIDE LABS:  CBC:   Lab Results   Component Value Date    WBC 9.6 11/04/2021    WBC 11.1 (H) 11/11/2019    HGB 10.1 (L) 09/12/2023    HGB 13.7 11/04/2021    HCT 41.3 11/04/2021    HCT 42.7 11/11/2019     11/04/2021     11/11/2019     BMP:   Lab Results   Component Value Date     11/11/2019     06/12/2014    POTASSIUM 4.6 11/04/2021    POTASSIUM 4.3 11/11/2019    CHLORIDE 103 11/11/2019    CHLORIDE 102 06/12/2014    CO2 29 11/11/2019    CO2 27 06/12/2014    BUN 13 11/11/2019    BUN 13 06/12/2014    CR 0.77 11/11/2019    CR 0.80 06/12/2014    GLC 79 11/11/2019    GLC 83 06/12/2014     COAGS: No results found for: PTT, INR, FIBR  POC:   Lab Results   Component Value Date    HCG  01/25/2010     Negative   This test provides a presumptive diagnosis of pregnancy or non-pregnancy. A   confirmed pregnancy diagnosis should only be made by a physician after all   clinical and laboratory findings have been evaluated.     HEPATIC:   Lab Results   Component Value Date    ALBUMIN 4.2 11/11/2019    PROTTOTAL 7.5 11/11/2019    ALT 13 11/11/2019    AST 17 11/11/2019    ALKPHOS 56 11/11/2019    BILITOTAL 0.3 11/11/2019     OTHER:   Lab Results   Component Value Date    PURNIMA 9.6 11/11/2019    TSH 1.65 11/11/2019    T4 0.96 09/29/2014       Anesthesia Plan    ASA Status:  2       Anesthesia Type: Spinal.              Consents    Anesthesia Plan(s) and associated risks, benefits, and realistic alternatives discussed. Questions answered and patient/representative(s) expressed understanding.     - Discussed:     - Discussed with:  Patient            Postoperative Care            Comments:                Paula Lin MD

## 2023-09-12 NOTE — PLAN OF CARE
Returned to MAC # 1 at 0814 following a primary  for breech presentation. Incision is covered with 4X4 dressings and paper tape. Fundus is firm at u/unit(s) with scant rubra. Denies any pain or nausea. Did have a brief episode of hypotension which was relieved with LR bolus of 250 ml. Baby placed skin to skin and is breast feeding well. Snuday hugger blanket applied. Tolerating apple juice.

## 2023-09-12 NOTE — PLAN OF CARE
"Dr Castanon notified of hypotension (see flow sheet). Orders received for  ml bolus. Pt denies feeling lightheaded, dizzy or nausea. Fundus remains firm at u/unit(s) with scant rubra. In addition, pt reports \"I always run a low blood pressure\".  "

## 2023-09-12 NOTE — ANESTHESIA CARE TRANSFER NOTE
Patient: Elaine Galloway    Procedure: Procedure(s):  PRIMARY  SECTION       Diagnosis: Breech presentation [O32.1XX0]  Low amniotic fluid, unspecified trimester, not applicable or unspecified fetus [O41.00X0]  Diagnosis Additional Information: No value filed.    Anesthesia Type:   Spinal     Note:    Oropharynx: oropharynx clear of all foreign objects and spontaneously breathing  Level of Consciousness: awake  Oxygen Supplementation: room air    Independent Airway: airway patency satisfactory and stable  Dentition: dentition unchanged  Vital Signs Stable: post-procedure vital signs reviewed and stable  Report to RN Given: handoff report given  Patient transferred to: Labor and Delivery  Comments: Pt to MAC 1 with spouse and baby.  Report to RN.  No questions/concerns.  Handoff Report: Identifed the Patient, Identified the Reponsible Provider, Reviewed the pertinent medical history, Discussed the surgical course, Reviewed Intra-OP anesthesia mangement and issues during anesthesia, Set expectations for post-procedure period and Allowed opportunity for questions and acknowledgement of understanding  Vitals:  Vitals Value Taken Time   /65 23 0814   Temp     Pulse 62    Resp 16    SpO2 96 % 23 0815   Vitals shown include unvalidated device data.    Electronically Signed By: PORTILLO Bassett CRNA  2023  8:16 AM

## 2023-09-12 NOTE — ANESTHESIA PROCEDURE NOTES
"Intrathecal injection Procedure Note    Pre-Procedure   Staff -        Performed By: anesthesiologist       Location: OR       Procedure Start/Stop Times: 9/12/2023 7:33 AM and 9/12/2023 7:38 AM       Pre-Anesthestic Checklist: patient identified, IV checked, risks and benefits discussed, informed consent, monitors and equipment checked, pre-op evaluation and at physician/surgeon's request  Timeout:       Correct Patient: Yes        Correct Procedure: Yes        Correct Site: Yes        Correct Position: Yes   Procedure Documentation  Procedure: intrathecal injection       Patient Position: sitting       Skin prep: Chloraprep       Insertion Site: L3-4. (midline approach).       Needle Gauge: 24.        Needle Length (Inches): 3.5        Spinal Needle Type: Pencan       Introducer used       # of attempts: 2 and  # of redirects:  2    Assessment/Narrative         Paresthesias: No.       Sensory Level: T6       CSF fluid: clear.       Opening pressure was cmH2O while  Sitting.      Medication(s) Administered   0.75% Hyperbaric Bupivacaine (Intrathecal) - Intrathecal   1.6 mL - 9/12/2023 7:33:00 AM  Fentanyl PF (Intrathecal) - Intrathecal   15 mcg - 9/12/2023 7:33:00 AM  Morphine PF 1 mg/mL (Intrathecal) - Intrathecal   0.2 mg - 9/12/2023 7:33:00 AM  Medication Administration Time: 9/12/2023 7:33 AM     Comments:  Patient reported history of scoliosis  Attempted at 2 levels, tight spaces      FOR [facility] ONLY:   Pain Team Contact information: please page the Pain Team Via [employer]. Search \"Pain\". During daytime hours, please page the attending first. At night please page the resident first.      "

## 2023-09-12 NOTE — PLAN OF CARE
Elaine is admitted to MAC # 1 for a scheduled primary  for breech presentation. EFM monitors explained and placed x's 2. Baseline , accelerations and FM are present. Procedure was explained and questions answered. Informed consent was signed and signature witnessed. Prepped for surgery.

## 2023-09-12 NOTE — PLAN OF CARE
Patient is having lower blood pressures 80s-100s/60s, IV fluids infusing. Fundus midline at umbilicus. Breastfeeding infant every 2-3 hours. Utilizing ice packs at incision site. Pain managed with tylenol, Toradol, and oxytocin. Incision dressing C/D/I. Luke in place, plan for removal in morning. Patient dangled feet and stood at bedside. Infant in room.

## 2023-09-12 NOTE — PLAN OF CARE
Data: Elaine Galloway transferred to 422 via cart at 1030. Baby transferred via parent's arms.  Action: Receiving unit notified of transfer: Yes. Patient and family notified of room change. Report given to Marion LY at 1030. Belongings sent to receiving unit. Accompanied by Registered Nurse. Oriented patient to surroundings. Call light within reach. ID bands double-checked with receiving RN.  Response: Patient tolerated transfer and is stable.

## 2023-09-12 NOTE — PROCEDURES
Limited OB ultrasound    Indication- Confirm breech prior to   Findings- Single IUP in the Nehemias Breech presentation with normal cardiac activity. Oligohydramnios noted. Posterior placenta no previa  Imp- Nehemias Breech. Oligohydramnios

## 2023-09-12 NOTE — OP NOTE
Delivery Note    Surgeon(s) and Role:     * Jw Erickson MD - Primary    Preoperative Diagnosis:  Intrauterine pregnancy at 38w1d (MANI: 2023, by Patient Reported)  Malpresentation   Breech presentation  Postoperative Diagnosis:  Same    History: 33 year old  at 38w1d who presents Breech    Name of Operation: Primary Low Transverse  Section via Pfannensteil      FINDINGS:   Viable female infant, in Breech presentation. APGARS were 8 / 8  at 1 and 5 minutes respectively. Placenta with central 3 vessel cord. Normal uterus, bilateral tubes and ovaries.     QBL: 311ml    Infant Birth Weight: 3.34 kg (7 lb 5.8 oz)     Informed Consent:  The risks, benefits, complications, and alternatives were discussed with the patient. The patient understood that the risks of  section include, but are not limited to: injury to nearby structures or organs, infection, blood loss and possible need for transfusion, and potential need for additional procedures. The patient stated understanding and desired to proceed. All questions were answered. The site of surgery was properly noted and marked. The patient was identified as Elaine Galloway and the procedure verified as a  delivery. A Time Out was held and the above information confirmed.    Procedure Details:  The patient was taken to the operating room where spinal anesthesia was found to be adequate. She was given 2 grams of Ancef. She was then prepped and draped in the normal sterile fashion in the supine position with a leftward tilt. A Pfannenstiel skin incision was then made with the scalpel and carried through to the underlying layer of fascia. The fascia was incised in the midline and the incision extended laterally bluntly. The rectus muscles were then  in the midline, and the peritoneum was identified and entered bluntly. The peritoneal incision was then extended superiorly and inferiorly with good visualization  of the bladder. The bladder blade was then inserted and the vesicouterine peritoneum identified. the bladder was clearly deliniated away from the proposed hysteromtomy and decision was made to proceed without a bladder flap.    A low transverse hysterotomy was made with the scalpel. The uterine incision was then extended using bandage scissors. The bladder blade was removed and the infant was delivered using standard breech maneuvers. The cord was clamped and cut and the infant was transferred to the warmer and attended to by the nursing team.    The placenta was then delivered using external massage. The uterus was exteriorized and cleared of all clots and debris. The uterine incision was repaired with 0-monocryl in a running, locked fashion.  Good hemostasis was noted after hysterotomy closure.      The hysterotomy was examined in situ and hemostasis was satisfactory. The ventral aspect of the fascia was inspected and no fascial defects were found. The rectus muscles were inspected and found to be intact and hemostatic The fascia was2 reapproximated with 0 PDSin a running fashion. The subcutaneous tissue was then irrigated and the bovie was used to obtain excellent hemostasis.  The skin was closed with absorbable staples.  The patient tolerated the procedure well and was taken to the recovery room in stable condition.    Complications: None  Sponge/Instrument/Needle Counts: The sponge, lap and needle counts were correct x3.       Jw Erickson MD

## 2023-09-13 LAB — HGB BLD-MCNC: 9.3 G/DL (ref 11.7–15.7)

## 2023-09-13 PROCEDURE — 85018 HEMOGLOBIN: CPT | Performed by: OBSTETRICS & GYNECOLOGY

## 2023-09-13 PROCEDURE — 250N000013 HC RX MED GY IP 250 OP 250 PS 637: Performed by: OBSTETRICS & GYNECOLOGY

## 2023-09-13 PROCEDURE — 120N000001 HC R&B MED SURG/OB

## 2023-09-13 PROCEDURE — 36415 COLL VENOUS BLD VENIPUNCTURE: CPT | Performed by: OBSTETRICS & GYNECOLOGY

## 2023-09-13 RX ORDER — IBUPROFEN 800 MG/1
800 TABLET, FILM COATED ORAL EVERY 6 HOURS
Status: DISCONTINUED | OUTPATIENT
Start: 2023-09-13 | End: 2023-09-15 | Stop reason: HOSPADM

## 2023-09-13 RX ADMIN — IBUPROFEN 800 MG: 800 TABLET ORAL at 04:21

## 2023-09-13 RX ADMIN — ACETAMINOPHEN 975 MG: 325 TABLET, FILM COATED ORAL at 18:21

## 2023-09-13 RX ADMIN — IBUPROFEN 800 MG: 800 TABLET ORAL at 22:19

## 2023-09-13 RX ADMIN — ACETAMINOPHEN 975 MG: 325 TABLET, FILM COATED ORAL at 06:07

## 2023-09-13 RX ADMIN — OXYCODONE HYDROCHLORIDE 5 MG: 5 TABLET ORAL at 16:17

## 2023-09-13 RX ADMIN — IBUPROFEN 800 MG: 800 TABLET ORAL at 16:17

## 2023-09-13 RX ADMIN — ACETAMINOPHEN 975 MG: 325 TABLET, FILM COATED ORAL at 12:23

## 2023-09-13 RX ADMIN — SENNOSIDES AND DOCUSATE SODIUM 1 TABLET: 8.6; 5 TABLET ORAL at 20:21

## 2023-09-13 RX ADMIN — OXYCODONE HYDROCHLORIDE 5 MG: 5 TABLET ORAL at 02:45

## 2023-09-13 RX ADMIN — SENNOSIDES AND DOCUSATE SODIUM 1 TABLET: 8.6; 5 TABLET ORAL at 10:36

## 2023-09-13 RX ADMIN — OXYCODONE HYDROCHLORIDE 5 MG: 5 TABLET ORAL at 20:21

## 2023-09-13 RX ADMIN — OXYCODONE HYDROCHLORIDE 5 MG: 5 TABLET ORAL at 12:22

## 2023-09-13 RX ADMIN — OXYCODONE HYDROCHLORIDE 5 MG: 5 TABLET ORAL at 07:58

## 2023-09-13 RX ADMIN — IBUPROFEN 800 MG: 800 TABLET ORAL at 10:35

## 2023-09-13 ASSESSMENT — ACTIVITIES OF DAILY LIVING (ADL)
ADLS_ACUITY_SCORE: 18

## 2023-09-13 NOTE — PROGRESS NOTES
[facility]   Obstetrics Progress Note    Subjective: This is the patient's first day since  delivery. She is doing well.  Luke catheter is still in place and she has had minimal ambulation. Pain is controlled with medication.    Objective:   All vitals stable  Temp: 97.9  F (36.6  C) Temp src: Oral BP: 103/66 Pulse: 68   Resp: 16 SpO2: 98 % O2 Device: None (Room air)      EXAM:  Constitutional: healthy, alert, no distress.   Abdomen: Abdomen soft, non-tender. BS normal. No masses, fundus is firm.  JOINT/EXTREMITIES: extremities normal     Last hemoglobin was   Hemoglobin   Date Value Ref Range Status   2023 9.3 (L) 11.7 - 15.7 g/dL Final   2014 13.8 11.7 - 15.7 g/dL Final   ]    Assessment: Stable postpartum course.    Plan: Routine care. Ambulation encouraged  Breast feeding strategies discussed  Pain control measures as needed  Reportable signs and symptoms dicussed with the patient  Anticipate discharge tomorrow    Nelsy Becerra MD

## 2023-09-13 NOTE — ANESTHESIA POSTPROCEDURE EVALUATION
Patient: Elaine Galloway    Procedure: Procedure(s):  PRIMARY  SECTION       Anesthesia Type:  Spinal    Note:  Disposition: Inpatient   Postop Pain Control: Uneventful            Sign Out: Well controlled pain   PONV: No   Neuro/Psych: Uneventful            Sign Out: Acceptable/Baseline neuro status   Airway/Respiratory: Uneventful            Sign Out: Acceptable/Baseline resp. status   CV/Hemodynamics: Uneventful            Sign Out: Acceptable CV status; No obvious hypovolemia; No obvious fluid overload   Other NRE:    DID A NON-ROUTINE EVENT OCCUR? No           Last vitals:  Vitals Value Taken Time   BP 82/50 23 1000   Temp 98.2  F (36.8  C) 23 1000   Pulse     Resp 16 23 1000   SpO2 98 % 23 1000       Electronically Signed By: Paula Lin MD  2023  8:26 AM

## 2023-09-13 NOTE — PROVIDER NOTIFICATION
09/12/23 2300   Provider Notification   Provider Name/Title Dr. Becerra   Method of Notification Phone   Request Evaluate-Remote   Notification Reason Other     Provider paged regarding necessity of IV. Pt. C/o of pain with flushing, line appears to be patent & has blood return. Provider updated regarding softer blood pressures, pt. Is aymptomatic, bennett output adequate, & patient is okay with placing another IV if needed. RN to remove IV & monitor site.

## 2023-09-13 NOTE — PLAN OF CARE
Goal Outcome Evaluation:    VSS, softer blood pressures, asymptomatic. Up with an assist of 1. Moderate incisional pain/pressure controlled with ibuprofen, tylenol, oxycodone, & ice application. C/s WDL, no apparent signs of infection present.  supportive @ bedside. Bonding well with baby girl- Yen. Breastfeeding every 2-3 with little to no staff assistance. Denies preeclampsia symptoms. Fundus firm @ midline. Luke to remain in place until POD1; output adequate.

## 2023-09-13 NOTE — PLAN OF CARE
VSS. Soft blood pressures, asymptomatic. Luke catheter removed, void x2. Reporting incisional pain, controlled with ibuprofen, tylenol, oxycodone, and ice. C/S incision dressing clean, dry, and intact. Breastfeeding every 2-3 hours. Bonding well with baby girl.  at bedside and supportive.

## 2023-09-13 NOTE — PLAN OF CARE
Vital signs stable. Postpartum checks within defined limits. Ambulating independently, denies dizziness. Voiding spontaneously, passing flatus. Reports pain is well controlled with ibuprofen, tylenol, and oxycodone. Abdominal incision dressing is clean, dry, intact. No signs of infection in surrounding tissue. Breastfeeding every 2-3 hours, latch observed. Demonstrated hand expression, able to express drops for infant. Attentive to  cues, bonding well.

## 2023-09-14 PROCEDURE — 999N000080 HC STATISTIC IP LACTATION SERVICES 16-30 MIN

## 2023-09-14 PROCEDURE — 120N000001 HC R&B MED SURG/OB

## 2023-09-14 PROCEDURE — 250N000013 HC RX MED GY IP 250 OP 250 PS 637: Performed by: OBSTETRICS & GYNECOLOGY

## 2023-09-14 RX ORDER — OXYCODONE HYDROCHLORIDE 5 MG/1
5 TABLET ORAL EVERY 4 HOURS PRN
Qty: 8 TABLET | Refills: 0 | Status: SHIPPED | OUTPATIENT
Start: 2023-09-14

## 2023-09-14 RX ADMIN — ACETAMINOPHEN 975 MG: 325 TABLET, FILM COATED ORAL at 12:13

## 2023-09-14 RX ADMIN — ACETAMINOPHEN 975 MG: 325 TABLET, FILM COATED ORAL at 06:04

## 2023-09-14 RX ADMIN — SENNOSIDES AND DOCUSATE SODIUM 2 TABLET: 50; 8.6 TABLET ORAL at 08:27

## 2023-09-14 RX ADMIN — SENNOSIDES AND DOCUSATE SODIUM 2 TABLET: 50; 8.6 TABLET ORAL at 21:59

## 2023-09-14 RX ADMIN — IBUPROFEN 800 MG: 800 TABLET ORAL at 16:23

## 2023-09-14 RX ADMIN — IBUPROFEN 800 MG: 800 TABLET ORAL at 04:07

## 2023-09-14 RX ADMIN — IBUPROFEN 800 MG: 800 TABLET ORAL at 22:00

## 2023-09-14 RX ADMIN — OXYCODONE HYDROCHLORIDE 5 MG: 5 TABLET ORAL at 13:37

## 2023-09-14 RX ADMIN — OXYCODONE HYDROCHLORIDE 5 MG: 5 TABLET ORAL at 22:00

## 2023-09-14 RX ADMIN — SIMETHICONE 80 MG: 80 TABLET, CHEWABLE ORAL at 12:18

## 2023-09-14 RX ADMIN — OXYCODONE HYDROCHLORIDE 5 MG: 5 TABLET ORAL at 00:19

## 2023-09-14 RX ADMIN — OXYCODONE HYDROCHLORIDE 5 MG: 5 TABLET ORAL at 17:43

## 2023-09-14 RX ADMIN — SIMETHICONE 80 MG: 80 TABLET, CHEWABLE ORAL at 00:09

## 2023-09-14 RX ADMIN — IBUPROFEN 800 MG: 800 TABLET ORAL at 10:41

## 2023-09-14 RX ADMIN — ACETAMINOPHEN 975 MG: 325 TABLET, FILM COATED ORAL at 18:28

## 2023-09-14 RX ADMIN — ACETAMINOPHEN 975 MG: 325 TABLET, FILM COATED ORAL at 00:09

## 2023-09-14 ASSESSMENT — ACTIVITIES OF DAILY LIVING (ADL)
ADLS_ACUITY_SCORE: 18

## 2023-09-14 NOTE — PROGRESS NOTES
POD 2  Vss aeb  No c/o still having significant pain with ambulation  FF,NT  Incision- Intact  EXT- NT    Imp- POD2  Plan-routine care  Possible discharge later today

## 2023-09-14 NOTE — PLAN OF CARE
Vital signs stable. Postpartum checks within defined limits. Ambulating independently. Voiding adequately, passing flatus. Reports pain is manageable with PRN oxy, scheduled tylenol and ibuprofen. Abdominal incision is clean, dry and approximated. No signs of infection in surrounding tissue. Breastfeeding every 2-3 hours, latch observed. Attentive to  cues, bonding well.  at bedside and supportive.

## 2023-09-14 NOTE — PLAN OF CARE
Vital signs stable. Soft Bps, asymptomatic.Postpartum checks WDL. C/S incision is clean, dry, and intact; no signs of infection. Pt is up ad capri, voiding w/o difficulties. Pt is independent in self cares. Pt is breast feeding every 2-3 hrs. Pain well controlled with Tylenol, Ibuprofen, Oxycodone, and ice packs. Pt stated increased comfort after each medication.  Frequent ambulation encouraged. Pt is attentive to all  cues and cares. Positive bonding observed. FOB at bedside, supportive of pt.

## 2023-09-14 NOTE — LACTATION NOTE
"Lactation in to see patient. Baby ready to feed at time of visit. Patient brought baby up in football hold. Assisted with proper positioning. Baby latched with with minimal assist. Deep nutritive sucking with consistent swallows heard and pointed out to parents. Elaine states that she does feel breast changes happening. First baby for family. Reviewed supply and demand, when milk transitions, pumping and milk storage. Has a spectra 2 and a [place] pump at home. Discussed clogged ducts and mastitis. Baby slightly jaundice, reviewed jaundice education and importance of frequent feeds, and how it affects feeds. Nipples tender, patient states she has had \"hard spots on nipple\" possible blisters, using mother love and gel pads. Spots not visible to writer or patient at that time. Encouraged to keep nipples hydrated.  All questions answered, knows lactation available for any questions or concerns.  " Problem: Pain  Goal: #Acceptable pain level achieved/maintained at rest using NRS/Faces  This goal is used for patients who can self-report.  Acceptable means the level is at or below the identified comfort/function goal.  Outcome: Outcome Not Met, Continue to Monitor  Schedule oral tramadol reduced the pain, patient stated if no movement fell sore, pain worse with movement.

## 2023-09-15 VITALS
OXYGEN SATURATION: 98 % | HEIGHT: 66 IN | WEIGHT: 176 LBS | BODY MASS INDEX: 28.28 KG/M2 | TEMPERATURE: 97.4 F | RESPIRATION RATE: 14 BRPM | DIASTOLIC BLOOD PRESSURE: 64 MMHG | HEART RATE: 65 BPM | SYSTOLIC BLOOD PRESSURE: 99 MMHG

## 2023-09-15 PROCEDURE — 250N000013 HC RX MED GY IP 250 OP 250 PS 637: Performed by: OBSTETRICS & GYNECOLOGY

## 2023-09-15 RX ADMIN — SENNOSIDES AND DOCUSATE SODIUM 2 TABLET: 50; 8.6 TABLET ORAL at 08:02

## 2023-09-15 RX ADMIN — IBUPROFEN 800 MG: 800 TABLET ORAL at 10:07

## 2023-09-15 RX ADMIN — ACETAMINOPHEN 975 MG: 325 TABLET, FILM COATED ORAL at 06:30

## 2023-09-15 RX ADMIN — ACETAMINOPHEN 975 MG: 325 TABLET, FILM COATED ORAL at 00:38

## 2023-09-15 RX ADMIN — ACETAMINOPHEN 975 MG: 325 TABLET, FILM COATED ORAL at 12:09

## 2023-09-15 RX ADMIN — OXYCODONE HYDROCHLORIDE 5 MG: 5 TABLET ORAL at 07:53

## 2023-09-15 RX ADMIN — OXYCODONE HYDROCHLORIDE 5 MG: 5 TABLET ORAL at 12:11

## 2023-09-15 RX ADMIN — IBUPROFEN 800 MG: 800 TABLET ORAL at 03:57

## 2023-09-15 RX ADMIN — OXYCODONE HYDROCHLORIDE 5 MG: 5 TABLET ORAL at 02:25

## 2023-09-15 ASSESSMENT — ACTIVITIES OF DAILY LIVING (ADL)
ADLS_ACUITY_SCORE: 18

## 2023-09-15 NOTE — DISCHARGE INSTRUCTIONS
Warning Signs after Having a Baby    Keep this paper on your fridge or somewhere else where you can see it.    Call your provider if you have any of these symptoms up to 12 weeks after having your baby.    Thoughts of hurting yourself or your baby  Pain in your chest or trouble breathing  Severe headache not helped by pain medicine  Eyesight concerns (blurry vision, seeing spots or flashes of light, other changes to eyesight)  Fainting, shaking or other signs of a seizure    Call 9-1-1 if you feel that it is an emergency.     The symptoms below can happen to anyone after giving birth. They can be very serious. Call your provider if you have any of these warning signs.    My provider s phone number: _______________________    Losing too much blood (hemorrhage)    Call your provider if you soak through a pad in less than an hour or pass blood clots bigger than a golf ball. These may be signs that you are bleeding too much.    Blood clots in the legs or lungs    After you give birth, your body naturally clots its blood to help prevent blood loss. Sometimes this increased clotting can happen in other areas of the body, like the legs or lungs. This can block your blood flow and be very dangerous.     Call your provider if you:  Have a red, swollen spot on the back of your leg that is warm or painful when you touch it.   Are coughing up blood.     Infection    Call your provider if you have any of these symptoms:  Fever of 100.4 F (38 C) or higher.  Pain or redness around your stitches if you had an incision.   Any yellow, white, or green fluid coming from places where you had stitches or surgery.    Mood Problems (postpartum depression)    Many people feel sad or have mood changes after having a baby. But for some people, these mood swings are worse.     Call your provider right away if you feel so anxious or nervous that you can't care for yourself or your baby.    Preeclampsia (high blood pressure)    Even if you  didn't have high blood pressure when you were pregnant, you are at risk for the high blood pressure disease called preeclampsia. This risk can last up to 12 weeks after giving birth.     Call your provider if you have:   Pain on your right side under your rib cage  Sudden swelling in the hands and face    Remember: You know your body. If something doesn't feel right, get medical help.     For informational purposes only. Not to replace the advice of your health care provider. Copyright 2020 [facility]. All rights reserved. Clinically reviewed by Maria E Everett, RNC-OB, MSN. [employer]arnext [id number] - Rev 02/23.

## 2023-09-15 NOTE — LACTATION NOTE
This note was copied from a baby's chart.  Lactation check in prior to discharge.  Elaine reports her breasts feel sterling and heavy- discussed milk transitioning.  Parents both report that they hear infant gulping at breast and after 5-6 minutes infant pulls off of breast- concerned that infant is feeding only 5-6 minutes instead of 10-15 minutes like previously.  Discussed if they are hearing gulping and with breast changes that milk is transitioning and infant likely taking higher volume.  Encouraged to burp between breasts and offer second breast- Elaine reports infant will take second breast after 45 minutes.  Also discussed cue based feeds and signs of satiety. Reviewed weight loss of 9.3 % at 72 hours of age and now that milk is transitioning to expect weight loss to stabilize.  Discussed jaundice. Outpatient lactation resources provided and Elaine has spectra and [place]ow pumps at home.  Encouraged to call for latch assessment prior to discharge.  Bedside RN updated.

## 2023-09-15 NOTE — PLAN OF CARE
Discharge instructions completed.  Patient states she understands all discharge instructions and all of her questions have been answered.  Patient verbalizes when she needs to return to clinic for follow up for herself and baby.  She is caring for herself and her baby independently.  Prescriptions filled and given to patient/family.  Postpartum depression symptoms reviewed and encouraged frequent review of depression scale.  Patient discharged with partner and baby at 1330.

## 2023-09-15 NOTE — PROGRESS NOTES
"Feels well, pain controlled.  BP 99/64 (BP Location: Left arm, Patient Position: Semi-Hall's, Cuff Size: Adult Regular)   Pulse 65   Temp 97.4  F (36.3  C) (Oral)   Resp 14   Ht 1.676 m (5' 6\")   Wt 79.8 kg (176 lb)   SpO2 98%   Breastfeeding Unknown   BMI 28.41 kg/m     NAD  Abd Soft, ND  Inc CDI    POD3 s/p LTCS for breech  Discharge to home  Follow up 2 and 6 weeks    Virginia Bird MD   "

## 2023-09-15 NOTE — PLAN OF CARE
Vital signs stable. Postpartum checks WDL. Pt is up ad capri, voiding w/o difficulties. Pt is independent in self cares. Pt is breast feeding every 2-3, tolerating well. Pain well controlled with Tylenol, Ibuprofen, Oxycodone, and ice packs. Pt stated increased comfort after each medication. Pt is attentive to all  cues and cares. Positive bonding observed. FOB at bedside, supportive of pt. Plan to discharge today.

## 2023-09-18 ENCOUNTER — LACTATION ENCOUNTER (OUTPATIENT)
Age: 33
End: 2023-09-18

## 2023-09-18 NOTE — LACTATION NOTE
This note was copied from a baby's chart.  LC visit. Yen readmitted for hyperbilirubinemia, levels are improving with phototherapy - she is working on breastfeeding and Elaine is offering any EBM pumped back to Yen. Writer present for 1220 feeding - Yen nursed both breast for ~10 minutes each, active and nutritive with 1:1 swallows heard. Gulping at times, discussed hand pumping to soften prior to latch if needed, shifting back to use gravity to help slow milk flow. Elaine had 30mL of pumped EBM at bedside, her milk is transitioning. Post feed weight showed that Yen transferred 34mL from breast. Praise and support given. Encouraged pumping post feed while FOB offers bottle. They are hoping to discharge later today pending repeat TSB - briefly discussed how to wean from additional pumping/bottle feeding per her preference. Encouraged outpatient lactation follow up once home, reviewed resources. All questions answered - encouraged Elaine to reach out with any additional needs.

## 2023-09-29 ENCOUNTER — NURSE TRIAGE (OUTPATIENT)
Dept: NURSING | Facility: CLINIC | Age: 33
End: 2023-09-29

## 2023-09-29 ENCOUNTER — OFFICE VISIT (OUTPATIENT)
Dept: URGENT CARE | Facility: URGENT CARE | Age: 33
End: 2023-09-29
Payer: COMMERCIAL

## 2023-09-29 VITALS
BODY MASS INDEX: 26.99 KG/M2 | OXYGEN SATURATION: 100 % | DIASTOLIC BLOOD PRESSURE: 76 MMHG | SYSTOLIC BLOOD PRESSURE: 115 MMHG | TEMPERATURE: 98.2 F | WEIGHT: 162 LBS | RESPIRATION RATE: 16 BRPM | HEIGHT: 65 IN | HEART RATE: 74 BPM

## 2023-09-29 DIAGNOSIS — L50.9 URTICARIA: ICD-10-CM

## 2023-09-29 DIAGNOSIS — L50.9 URTICARIA: Primary | ICD-10-CM

## 2023-09-29 PROCEDURE — 99213 OFFICE O/P EST LOW 20 MIN: CPT | Performed by: FAMILY MEDICINE

## 2023-09-29 RX ORDER — TRIAMCINOLONE ACETONIDE 1 MG/G
CREAM TOPICAL 2 TIMES DAILY
Qty: 80 G | Refills: 0 | Status: SHIPPED | OUTPATIENT
Start: 2023-09-29

## 2023-09-29 RX ORDER — PREDNISONE 20 MG/1
40 TABLET ORAL DAILY
Qty: 10 TABLET | Refills: 0 | Status: SHIPPED | OUTPATIENT
Start: 2023-09-29 | End: 2023-09-29

## 2023-09-29 RX ORDER — TRIAMCINOLONE ACETONIDE 1 MG/G
CREAM TOPICAL 2 TIMES DAILY
Qty: 80 G | Refills: 0 | Status: SHIPPED | OUTPATIENT
Start: 2023-09-29 | End: 2023-09-29

## 2023-09-29 RX ORDER — PREDNISONE 20 MG/1
40 TABLET ORAL DAILY
Qty: 10 TABLET | Refills: 0 | Status: SHIPPED | OUTPATIENT
Start: 2023-09-29

## 2023-09-29 NOTE — PROGRESS NOTES
"SUBJECTIVE:  Chief Complaint   Patient presents with    Urgent Care     Acute hives onset x 1 hr      Elaine Galloway is a 33 year old female who presents with a chief complaint of rash on left knee X 1 hour.    Breastfeeding 2 weeks old infant  No changes in foods and product use    Has small itchy spot on left knee, progressively got larger, itchy, raised.  Did try taking claritin X 1 and applied topical hydrocortisone but rash continued to get larger.  Did try using a baby wipe to area.    Past Medical History:   Diagnosis Date    Abnormal Pap smear 9/08, 11/09    colp 1/10 LINDA I    ASCUS favor benign 02/2014    Neg HPV, pap in 3 years    Controlled substance agreement signed 09/13/2014    Environmental allergies      Current Outpatient Medications   Medication Sig Dispense Refill    docusate sodium (COLACE) 50 MG capsule Take 50 mg by mouth daily      Ferrous Gluconate-C-Folic Acid (IRON-C PO) Take 1 tablet by mouth daily      omeprazole (PRILOSEC) 40 MG DR capsule Take 40 mg by mouth daily      oxyCODONE (ROXICODONE) 5 MG tablet Take 1 tablet (5 mg) by mouth every 4 hours as needed for breakthrough pain 8 tablet 0    Prenatal Vit-Fe Fumarate-FA (PNV PRENATAL PLUS MULTIVITAMIN) 27-1 MG TABS per tablet Take 1 tablet by mouth daily       Social History     Tobacco Use    Smoking status: Never     Passive exposure: Past    Smokeless tobacco: Never   Substance Use Topics    Alcohol use: Not Currently     Alcohol/week: 3.0 standard drinks of alcohol     Types: 3 Cans of beer per week       ROS:  Review of systems negative except as stated above.    EXAM:   /76   Pulse 74   Temp 98.2  F (36.8  C) (Oral)   Resp 16   Ht 1.651 m (5' 5\")   Wt 73.5 kg (162 lb)   SpO2 100%   Breastfeeding Yes   BMI 26.96 kg/m    GENERAL APPEARANCE: healthy, alert and no distress  EXTREMITIES: peripheral pulses normal  SKIN: left knee with large redden slight raised and faint warmth with irregular welts on " edges      ASSESSMENT/PLAN:  (L50.9) Urticaria  (primary encounter diagnosis)  Comment: left knee  Plan: triamcinolone (KENALOG) 0.1 % external cream,         predniSONE (DELTASONE) 20 MG tablet            Okay for claritin 10 mg X2, RX triamcinolone 0.1 % cream to area twice a day.  Avoid triggers if able to identify.  Avoid benadryl as currently breastfeeding.    RX prednisone burst printed and given to use if develops more generalized urticaria    Follow up with primary provider if no improvement of symptoms in 1-2 weeks    Denzel Bryson MD  September 29, 2023 7:12 PM

## 2023-09-30 ENCOUNTER — NURSE TRIAGE (OUTPATIENT)
Dept: NURSING | Facility: CLINIC | Age: 33
End: 2023-09-30
Payer: COMMERCIAL

## 2023-09-30 NOTE — TELEPHONE ENCOUNTER
Patient was seen in  today at 6pm.  Patient was told she has hives.  Patient was prescribed a cream and a steroid.  The pharmacy closed and so was unable to pick them up.  Patient states her hives have been spreading.  Patient has taken a claritan at 4pm and again at 8pm.      Patient denies any breathing difficulty, no swelling of tongue, mouth or face, no cough/hoarseness, no weakness, no confusion.  No allergy or exposure that she is aware of, no joint swelling, no abdominal pain, no fever.    Patient is postpartum and breast feeding.    Care advise: can send prescriptions to a pharmacy that is open so you can pick them up, patient agreed, cool bath or cool cloth on hive area.  Call back if severe hives more than 24 hours, hives lasting more than a week or any worsening conditions.      Kenya Negron RN   09/29/23 11:57 PM  [facility] Nurse Advisor  Reason for Disposition   Widespread hives    Additional Information   Negative: [1] Life-threatening reaction (anaphylaxis) in the past to similar substance (e.g., food, insect bite/sting, chemical, etc.) AND [2] < 2 hours since exposure   Negative: Difficulty breathing or wheezing now   Negative: [1] Swollen tongue AND [2] rapid onset   Negative: [1] Hoarseness or cough now AND [2] rapid onset   Negative: Shock suspected (e.g., cold/pale/clammy skin, too weak to stand, low BP, rapid pulse)   Negative: Difficult to awaken or acting confused (e.g., disoriented, slurred speech)   Negative: Sounds like a life-threatening emergency to the triager   Negative: [1] Bee, wasp, or yellow jacket sting AND [2] within last 24 hours   Negative: Blood-colored, dark red or purple rash   Negative: [1] Drug rash suspected AND [2] started taking new medicine within last 2 weeks(Exception: Antihistamine, eye drops, ear drops, decongestant or other OTC cough/cold medicines.)   Negative: Doesn't match the SYMPTOMS of hives   Negative: Swollen tongue   Negative: [1] Widespread  hives, itching or facial swelling AND [2] onset < 2 hours of exposure to high-risk allergen (e.g., sting, nuts, 1st dose of antibiotic)   Negative: Patient sounds very sick or weak to the triager   Negative: [1] MODERATE-SEVERE hives persist (i.e., hives interfere with normal activities or work) AND [2] taking antihistamine (e.g., Benadryl, Claritin) > 24 hours   Negative: [1] Hives have become worse AND [2] taking oral steroids (e.g., prednisone) > 24 hours   Negative: Joint swelling   Negative: [1] Abdominal pain AND [2] pain present > 12 hours   Negative: Fever   Negative: [1] Widespread hives, itching or facial swelling AND [2] onset > 2 hours after exposure to high-risk allergen (e.g., sting, nuts, 1st dose of antibiotic)   Negative: [1] Hives from food reaction AND [2] diagnosis never confirmed by a doctor (or NP/PA)   Negative: Hives persist > 1 week   Negative: [1] Hives has occurred 3 or more times in the last year AND [2] the cause was not found   Negative: [1] Hives from food reaction AND [2] diagnosis already confirmed   Negative: Localized hives    Protocols used: [facility]

## 2023-09-30 NOTE — TELEPHONE ENCOUNTER
Call from patient asking if the nurse sent the prednisone and kenalog cream to [facility].    Informed Rxs were sent before midnight to [facility].  Informed her to check w/ them again b/c it went through per EPIC.      Shazia Chance RN, BSN  Triage Nurse Advisor    Reason for Disposition    Caller has medicine question only, adult not sick, AND triager answers question    Protocols used: Medication Question Call-A-

## 2023-10-10 NOTE — TELEPHONE ENCOUNTER
Robel Rolle,   Patient is lost to pap tracking follow-up. Attempts to contact pt have been made per reminder process and there has been no reply and/or no appt scheduled.  If you are wanting any additional contact attempts please send to your care team staff.    Pap Hx:  9/08 LSIL (age 18),   1/09 pap ASCUS, prob HR HPV, 1/10 colp LINDA I,   pap 9/10 NIL, pap 5/11 NIL, pap 6/12: NIL  2/5/14: ASCUS, neg HPV. Plan pap in 3 years.  08/24/15 LSIL Pap, Neg HR HPV   11/2/15 Duncan LINDA I, LSIL Pap, + HR HPV   08/31/16 ASCUS Pap, Neg HR HPV   11/21/19 ASCUS Pap, Neg HR HPV   08/11/22 ASCUS Pap, Neg HR HPV Plan cotest in 1 year due 08/11/23. Results and recommendations released to the pt through [employer].  07/24/23 Reminder Mychart  08/18/23 Reminder call-spoke to the pt she said that she is currently 34 weeks pregnant. EDC 09/25/23 she will plan on doing this at her 6 week postpartum appt  09/20/23 pt delivered on 09/12/23 I will push out tracking another month  10/10/23 Lost to follow-up for pap tracking, ibrahima routed to provider

## 2023-10-29 ENCOUNTER — HEALTH MAINTENANCE LETTER (OUTPATIENT)
Age: 33
End: 2023-10-29

## 2024-10-03 ENCOUNTER — LAB (OUTPATIENT)
Dept: LAB | Facility: CLINIC | Age: 34
End: 2024-10-03
Payer: COMMERCIAL

## 2024-10-03 DIAGNOSIS — R39.15 URINARY URGENCY: ICD-10-CM

## 2024-10-03 DIAGNOSIS — R30.0 DYSURIA: ICD-10-CM

## 2024-10-03 LAB
ALBUMIN UR-MCNC: NEGATIVE MG/DL
APPEARANCE UR: CLEAR
BILIRUB UR QL STRIP: NEGATIVE
COLOR UR AUTO: YELLOW
GLUCOSE UR STRIP-MCNC: NEGATIVE MG/DL
HGB UR QL STRIP: ABNORMAL
KETONES UR STRIP-MCNC: NEGATIVE MG/DL
LEUKOCYTE ESTERASE UR QL STRIP: NEGATIVE
NITRATE UR QL: NEGATIVE
PH UR STRIP: 7 [PH] (ref 5–7)
SP GR UR STRIP: 1.02 (ref 1–1.03)
UROBILINOGEN UR STRIP-ACNC: 0.2 E.U./DL

## 2024-10-03 PROCEDURE — 81003 URINALYSIS AUTO W/O SCOPE: CPT | Mod: QW

## 2024-10-03 PROCEDURE — 87086 URINE CULTURE/COLONY COUNT: CPT

## 2024-10-04 LAB — BACTERIA UR CULT: NO GROWTH

## 2024-10-05 ENCOUNTER — OFFICE VISIT (OUTPATIENT)
Dept: URGENT CARE | Facility: URGENT CARE | Age: 34
End: 2024-10-05
Payer: COMMERCIAL

## 2024-10-05 VITALS
WEIGHT: 150.3 LBS | TEMPERATURE: 97.8 F | HEART RATE: 66 BPM | OXYGEN SATURATION: 99 % | BODY MASS INDEX: 25.01 KG/M2 | DIASTOLIC BLOOD PRESSURE: 71 MMHG | SYSTOLIC BLOOD PRESSURE: 98 MMHG | RESPIRATION RATE: 17 BRPM

## 2024-10-05 DIAGNOSIS — R39.89 URINARY PROBLEM: Primary | ICD-10-CM

## 2024-10-05 LAB
ALBUMIN UR-MCNC: ABNORMAL MG/DL
APPEARANCE UR: CLEAR
BACTERIA #/AREA URNS HPF: ABNORMAL /HPF
BILIRUB UR QL STRIP: NEGATIVE
CLUE CELLS: NORMAL
COLOR UR AUTO: YELLOW
GLUCOSE UR STRIP-MCNC: NEGATIVE MG/DL
HGB UR QL STRIP: NEGATIVE
KETONES UR STRIP-MCNC: NEGATIVE MG/DL
LEUKOCYTE ESTERASE UR QL STRIP: NEGATIVE
NITRATE UR QL: NEGATIVE
PH UR STRIP: 6 [PH] (ref 5–7)
RBC #/AREA URNS AUTO: ABNORMAL /HPF
SP GR UR STRIP: >=1.03 (ref 1–1.03)
SQUAMOUS #/AREA URNS AUTO: ABNORMAL /LPF
TRICHOMONAS, WET PREP: NORMAL
UROBILINOGEN UR STRIP-ACNC: 0.2 E.U./DL
WBC #/AREA URNS AUTO: ABNORMAL /HPF
WBC'S/HIGH POWER FIELD, WET PREP: NORMAL
YEAST, WET PREP: NORMAL

## 2024-10-05 PROCEDURE — 87210 SMEAR WET MOUNT SALINE/INK: CPT

## 2024-10-05 PROCEDURE — 99213 OFFICE O/P EST LOW 20 MIN: CPT | Performed by: PHYSICIAN ASSISTANT

## 2024-10-05 PROCEDURE — 81001 URINALYSIS AUTO W/SCOPE: CPT

## 2024-10-05 ASSESSMENT — ENCOUNTER SYMPTOMS
FREQUENCY: 1
DIARRHEA: 0
FEVER: 0
CONSTIPATION: 0
VOMITING: 0
DYSURIA: 1

## 2024-10-05 NOTE — PROGRESS NOTES
Assessment & Plan:        ICD-10-CM    1. Urinary problem  R39.89 UA Macroscopic with reflex to Microscopic and Culture - Lab Collect     Wet prep - lab collect     UA Macroscopic with reflex to Microscopic and Culture - Lab Collect     Wet prep - lab collect     UA Microscopic with Reflex to Culture            Plan/Clinical Decision Making:    Patient with several days of urinary symptoms. Negative UA today for infection. Negative wet prep.   Normal exam. Can continue to use cranberry pills and AZO as needed. Ibuprofen, Tylenol as needed.       Return if symptoms worsen or fail to improve, for in 2-3 days.     At the end of the encounter, I discussed results, diagnosis, medications. Discussed red flags for immediate return to clinic/ER, as well as indications for follow up if no improvement. Patient understood and agreed to plan. Patient was stable for discharge.        Agueda Hernandez PA-C on 10/5/2024 at 1:08 PM          Subjective:     HPI:    Elaine is a 34 year old female who presents to clinic today for the following health issues:  Chief Complaint   Patient presents with    UTI     35 yo F presents with burning during urination ,  frequency with itching  onset  Wednesday  Sample left Thursday came back no UTI  tx- Azo        HPI    Patient with burning, urgency, itching with urination. Has recurrent UTIs.   Had recent surgery. Hardware removal left leg.  Taking ibuprofen.     Taking AZO and cranberry pills with current symptoms.     No alcohol, no caffeine. Denies vaginal symptoms.       Review of Systems   Constitutional:  Negative for fever.   Gastrointestinal:  Negative for constipation, diarrhea and vomiting.   Genitourinary:  Positive for dysuria, frequency and urgency. Negative for vaginal discharge.         Patient Active Problem List   Diagnosis    Pap Smear Vag w ASC-US - 11/2009     ASCUS of cervix with negative high risk HPV    CARDIOVASCULAR SCREENING; LDL GOAL LESS THAN 160    Thyroid  function test abnormal    Controlled substance agreement signed    Encounter for screening for cardiovascular disorders    Cervical dysplasia    Encounter for triage in pregnant patient    Oligohydramnios     delivery delivered        Past Medical History:   Diagnosis Date    Abnormal Pap smear ,     colp 1/10 LINDA I    ASCUS favor benign 2014    Neg HPV, pap in 3 years    Controlled substance agreement signed 2014    Environmental allergies        Social History     Tobacco Use    Smoking status: Never     Passive exposure: Past    Smokeless tobacco: Never   Substance Use Topics    Alcohol use: Not Currently     Alcohol/week: 3.0 standard drinks of alcohol     Types: 3 Cans of beer per week             Objective:     Vitals:    10/05/24 1257   BP: 98/71   Pulse: 66   Resp: 17   Temp: 97.8  F (36.6  C)   SpO2: 99%   Weight: 68.2 kg (150 lb 4.8 oz)         Physical Exam   EXAM:   Pleasant, alert, appropriate appearance. NAD.  Head Exam: Normocephalic, atraumatic.  ABD: soft, Non-tender,  Ext/musculoskeletal: Grossly intact, No edema  No CVA tenderness        Results:  Results for orders placed or performed in visit on 10/05/24   UA Macroscopic with reflex to Microscopic and Culture - Lab Collect     Status: Abnormal    Specimen: Urine, NOS   Result Value Ref Range    Color Urine Yellow Colorless, Straw, Light Yellow, Yellow    Appearance Urine Clear Clear    Glucose Urine Negative Negative mg/dL    Bilirubin Urine Negative Negative    Ketones Urine Negative Negative mg/dL    Specific Gravity Urine >=1.030 1.003 - 1.035    Blood Urine Negative Negative    pH Urine 6.0 5.0 - 7.0    Protein Albumin Urine Trace (A) Negative mg/dL    Urobilinogen Urine 0.2 0.2, 1.0 E.U./dL    Nitrite Urine Negative Negative    Leukocyte Esterase Urine Negative Negative   UA Microscopic with Reflex to Culture     Status: Abnormal   Result Value Ref Range    Bacteria Urine Few (A) None Seen /HPF    RBC Urine 0-2 0-2  /HPF /HPF    WBC Urine 0-5 0-5 /HPF /HPF    Squamous Epithelials Urine None Seen None Seen /LPF    Narrative    Urine Culture not indicated   Wet prep - lab collect     Status: Normal    Specimen: Vagina; Swab   Result Value Ref Range    Trichomonas Absent Absent    Yeast Absent Absent    Clue Cells Absent Absent    WBCs/high power field None None

## 2025-02-22 ENCOUNTER — HEALTH MAINTENANCE LETTER (OUTPATIENT)
Age: 35
End: 2025-02-22

## (undated) DEVICE — LINEN BABY BLANKET 5434

## (undated) DEVICE — SOL NACL 0.9% IRRIG 1000ML BOTTLE 2F7124

## (undated) DEVICE — BAG CLEAR TRASH 1.3M 39X33" P4040C

## (undated) DEVICE — ESU PENCIL W/HOLSTER E2350H

## (undated) DEVICE — STPL SKIN SUBCUTICULAR INSORB  2030

## (undated) DEVICE — DRSG TEGADERM 4X10" 1627

## (undated) DEVICE — LINEN FULL SHEET 5511

## (undated) DEVICE — GLOVE BIOGEL PI MICRO INDICATOR UNDERGLOVE SZ 6.5 48965

## (undated) DEVICE — BLADE CLIPPER SGL USE 9680

## (undated) DEVICE — SYR 20ML LL W/O NDL

## (undated) DEVICE — SU MONOCRYL 0 CTX 36" Y398H

## (undated) DEVICE — ESU GROUND PAD ADULT W/CORD E7507

## (undated) DEVICE — CATH TRAY FOLEY 16FR SILICONE 907416

## (undated) DEVICE — CAP BABY PINK/BLUE IC-2

## (undated) DEVICE — GLOVE BIOGEL PI MICRO SZ 6.5 48565

## (undated) DEVICE — LINEN TOWEL PACK X10 5473

## (undated) DEVICE — SOL WATER IRRIG 1000ML BOTTLE 2F7114

## (undated) DEVICE — GLOVE PROTEXIS W/NEU-THERA 7.5  2D73TE75

## (undated) DEVICE — BLADE KNIFE SURG 10 371110

## (undated) DEVICE — LINEN HALF SHEET 5512

## (undated) DEVICE — PACK C-SECTION LF PL15OTA83B

## (undated) DEVICE — SU VICRYL 3-0 CT-1 36" J944H

## (undated) DEVICE — PREP CHLORAPREP 26ML TINTED ORANGE  260815

## (undated) DEVICE — STOCKING SLEEVE VASOPRESS COMPRESSION CALF MED VP501M

## (undated) DEVICE — TRANSFER DEVICE BLOOD NDL HOLDER 364880

## (undated) DEVICE — SU PDS II 0 CT 36" Z358T

## (undated) DEVICE — DRSG GAUZE 4X4" 2187

## (undated) RX ORDER — OXYTOCIN/0.9 % SODIUM CHLORIDE 30/500 ML
PLASTIC BAG, INJECTION (ML) INTRAVENOUS
Status: DISPENSED
Start: 2023-09-12

## (undated) RX ORDER — FENTANYL CITRATE 50 UG/ML
INJECTION, SOLUTION INTRAMUSCULAR; INTRAVENOUS
Status: DISPENSED
Start: 2023-09-12

## (undated) RX ORDER — KETOROLAC TROMETHAMINE 30 MG/ML
INJECTION, SOLUTION INTRAMUSCULAR; INTRAVENOUS
Status: DISPENSED
Start: 2023-09-12

## (undated) RX ORDER — MORPHINE SULFATE 1 MG/ML
INJECTION, SOLUTION EPIDURAL; INTRATHECAL; INTRAVENOUS
Status: DISPENSED
Start: 2023-09-12

## (undated) RX ORDER — FENTANYL CITRATE-0.9 % NACL/PF 10 MCG/ML
PLASTIC BAG, INJECTION (ML) INTRAVENOUS
Status: DISPENSED
Start: 2023-09-12

## (undated) RX ORDER — ONDANSETRON 2 MG/ML
INJECTION INTRAMUSCULAR; INTRAVENOUS
Status: DISPENSED
Start: 2023-09-12